# Patient Record
Sex: MALE | Employment: PART TIME | URBAN - METROPOLITAN AREA
[De-identification: names, ages, dates, MRNs, and addresses within clinical notes are randomized per-mention and may not be internally consistent; named-entity substitution may affect disease eponyms.]

---

## 2018-10-04 ENCOUNTER — HOSPITAL ENCOUNTER (EMERGENCY)
Facility: CLINIC | Age: 23
Discharge: HOME | End: 2018-10-04
Attending: EMERGENCY MEDICINE | Admitting: EMERGENCY MEDICINE
Payer: COMMERCIAL

## 2018-10-04 VITALS
SYSTOLIC BLOOD PRESSURE: 118 MMHG | RESPIRATION RATE: 16 BRPM | DIASTOLIC BLOOD PRESSURE: 81 MMHG | OXYGEN SATURATION: 99 % | TEMPERATURE: 98.3 F | HEART RATE: 91 BPM | WEIGHT: 210 LBS

## 2018-10-04 DIAGNOSIS — R00.2 PALPITATIONS: ICD-10-CM

## 2018-10-04 LAB
ANION GAP SERPL CALCULATED.3IONS-SCNC: 4 MMOL/L (ref 3–14)
BUN SERPL-MCNC: 12 MG/DL (ref 7–30)
CALCIUM SERPL-MCNC: 8.5 MG/DL (ref 8.5–10.1)
CHLORIDE SERPL-SCNC: 110 MMOL/L (ref 94–109)
CO2 SERPL-SCNC: 27 MMOL/L (ref 20–32)
CREAT SERPL-MCNC: 0.7 MG/DL (ref 0.66–1.25)
GFR SERPL CREATININE-BSD FRML MDRD: >90 ML/MIN/1.7M2
GLUCOSE SERPL-MCNC: 95 MG/DL (ref 70–99)
INTERPRETATION ECG - MUSE: NORMAL
POTASSIUM SERPL-SCNC: 4 MMOL/L (ref 3.4–5.3)
SODIUM SERPL-SCNC: 141 MMOL/L (ref 133–144)

## 2018-10-04 PROCEDURE — 93005 ELECTROCARDIOGRAM TRACING: CPT

## 2018-10-04 PROCEDURE — 99284 EMERGENCY DEPT VISIT MOD MDM: CPT

## 2018-10-04 PROCEDURE — 80048 BASIC METABOLIC PNL TOTAL CA: CPT | Performed by: EMERGENCY MEDICINE

## 2018-10-04 ASSESSMENT — ENCOUNTER SYMPTOMS
SHORTNESS OF BREATH: 0
LIGHT-HEADEDNESS: 1
PALPITATIONS: 1
DIAPHORESIS: 1

## 2018-10-04 NOTE — LETTER
October 4, 2018      To Whom It May Concern:      Tyrel Rankin was seen in our Emergency Department today, 10/04/18.  I expect his condition to improve over the next day.  He may return to school when improved.    Sincerely,        Chad A. Trierweiler, MD

## 2018-10-04 NOTE — ED AVS SNAPSHOT
Emergency Department    [address] [facility] [address]    Phone:  [phone]    Fax:  [phone]                                       Tyrel Rankin   MRN: [id number]    Department:   Emergency Department   Date of Visit:  10/4/2018           Patient Information     Date Of Birth          1995        Your diagnoses for this visit were:     Palpitations        You were seen by Trierweiler, Chad A, MD.      Follow-up Information     Follow up with Primary care physician In 1 week.        Follow up with  Emergency Department.    Specialty:  EMERGENCY MEDICINE    Why:  If symptoms worsen    Contact information:    [address] [facility] [place] [address] [phone]        Discharge Instructions       Discharge Instructions  Palpitations    Palpitations are an unusual awareness of your heartbeat. People often describe this as the heart skipping, fluttering, racing, irregular, or pounding. At this time, your provider has found no signs that your palpitations are due to a serious or life-threatening condition. However, sometimes there is a serious problem that does not show up right away.    Palpitations can be caused by caffeine, cigarettes, diet pills, energy drinks or supplements, other stimulants, and medications and street drugs. They can also be caused by anxiety, hormone conditions such as high thyroid, and other medical conditions. Sometimes they are a sign of abnormal rhythm in the heart. At this time, your provider did not find any dangerous cause of your symptoms.    Generally, every Emergency Department visit should have a follow-up clinic visit with either a primary or a specialty clinic/provider. Please follow-up as instructed by your emergency provider today.    Return to the Emergency Department if:    You get chest pain or tightness.    You are short of breath.    You get very weak or tired.    You pass out or faint.    Your heart rate is over 120 beats per  minute for more than 10 minutes while you are resting.     You have anything else that worries you.    What can I do to help myself?    Fill any prescriptions the provider gave you and take them right away.     Follow your provider s instructions about the prescription medicines you are on. Sometimes the provider may tell you to stop taking a medicine or change the dose.    If you smoke, this may be a good time to quit! The less you can smoke, the better.    Do not use energy drinks, diet pills, or stimulants. Limit your use of caffeine.  If you were given a prescription for medicine here today, be sure to read all of the information (including the package insert) that comes with your prescription.  This will include important information about the medicine, its side effects, and any warnings that you need to know about.  The pharmacist who fills the prescription can provide more information and answer questions you may have about the medicine.  If you have questions or concerns that the pharmacist cannot address, please call or return to the Emergency Department.     Remember that you can always come back to the Emergency Department if you are not able to see your regular provider in the amount of time listed above, if you get any new symptoms, or if there is anything that worries you.      24 Hour Appointment Hotline       To make an appointment at any [facility], call [phone] ([phone]). If you don't have a family doctor or clinic, we will help you find one. [facility] are conveniently located to serve the needs of you and your family.          ED Discharge Orders     Holter Monitor 24 hour - Adult                    Review of your medicines      Notice     You have not been prescribed any medications.            Procedures and tests performed during your visit     Basic metabolic panel    EKG 12 lead      Orders Needing Specimen Collection     None      Pending Results     No orders found  from 10/2/2018 to 10/5/2018.            Pending Culture Results     No orders found from 10/2/2018 to 10/5/2018.            Pending Results Instructions     If you had any lab results that were not finalized at the time of your Discharge, you can call the ED Lab Result RN at [phone]. You will be contacted by this team for any positive Lab results or changes in treatment. The nurses are available 7 days a week from 10A to 6:30P.  You can leave a message 24 hours per day and they will return your call.        Test Results From Your Hospital Stay        10/4/2018 10:12 PM      Component Results     Component Value Ref Range & Units Status    Sodium 141 133 - 144 mmol/L Final    Potassium 4.0 3.4 - 5.3 mmol/L Final    Chloride 110 (H) 94 - 109 mmol/L Final    Carbon Dioxide 27 20 - 32 mmol/L Final    Anion Gap 4 3 - 14 mmol/L Final    Glucose 95 70 - 99 mg/dL Final    Urea Nitrogen 12 7 - 30 mg/dL Final    Creatinine 0.70 0.66 - 1.25 mg/dL Final    GFR Estimate >90 >60 mL/min/1.7m2 Final    Non  GFR Calc    GFR Estimate If Black >90 >60 mL/min/1.7m2 Final    African American GFR Calc    Calcium 8.5 8.5 - 10.1 mg/dL Final                Clinical Quality Measure: Blood Pressure Screening     Your blood pressure was checked while you were in the emergency department today. The last reading we obtained was  BP: 122/83 . Please read the guidelines below about what these numbers mean and what you should do about them.  If your systolic blood pressure (the top number) is less than 120 and your diastolic blood pressure (the bottom number) is less than 80, then your blood pressure is normal. There is nothing more that you need to do about it.  If your systolic blood pressure (the top number) is 120-139 or your diastolic blood pressure (the bottom number) is 80-89, your blood pressure may be higher than it should be. You should have your blood pressure rechecked within a year by a primary care provider.  If  "your systolic blood pressure (the top number) is 140 or greater or your diastolic blood pressure (the bottom number) is 90 or greater, you may have high blood pressure. High blood pressure is treatable, but if left untreated over time it can put you at risk for heart attack, stroke, or kidney failure. You should have your blood pressure rechecked by a primary care provider within the next 4 weeks.  If your provider in the emergency department today gave you specific instructions to follow-up with your doctor or provider even sooner than that, you should follow that instruction and not wait for up to 4 weeks for your follow-up visit.        Thank you for choosing [facility]       Thank you for choosing [facility] for your care. Our goal is always to provide you with excellent care. Hearing back from our patients is one way we can continue to improve our services. Please take a few minutes to complete the written survey that you may receive in the mail after you visit with us. Thank you!        "[employer]hart Information     [employer] lets you send messages to your doctor, view your test results, renew your prescriptions, schedule appointments and more. To sign up, go to [email or web address] . Click on \"Log in\" on the left side of the screen, which will take you to the Welcome page. Then click on \"Sign up Now\" on the right side of the page.     You will be asked to enter the access code listed below, as well as some personal information. Please follow the directions to create your username and password.     Your access code is: [email or web address]  Expires: 2019 10:55 PM     Your access code will  in 90 days. If you need help or a new code, please call your [facility] clinic or [phone].        Care EveryWhere ID     This is your Care EveryWhere ID. This could be used by other organizations to access your [facility] medical records  [id number]        Equal Access to Services     WALKER SIMMS: Noris napoles" kendal Rodriguez, josette ashermapia fisher. So [facility] [phone].    ATENCIÓN: Si habla español, tiene a banuelos disposición servicios gratuitos de asistencia lingüística. Llame al [phone].    We comply with applicable federal civil rights laws and [place] laws. We do not discriminate on the basis of race, color, national origin, age, disability, sex, sexual orientation, or gender identity.            After Visit Summary       This is your record. Keep this with you and show to your community pharmacist(s) and doctor(s) at your next visit.

## 2018-10-04 NOTE — ED AVS SNAPSHOT
Emergency Department    [address] [place] [address]    Phone:  [phone]    Fax:  [phone]                                       Tyrel Rankin   MRN: [id number]    Department:   Emergency Department   Date of Visit:  10/4/2018           After Visit Summary Signature Page     I have received my discharge instructions, and my questions have been answered. I have discussed any challenges I see with this plan with the nurse or doctor.    ..........................................................................................................................................  Patient/Patient Representative Signature      ..........................................................................................................................................  Patient Representative Print Name and Relationship to Patient    ..................................................               ................................................  Date                                   Time    ..........................................................................................................................................  Reviewed by Signature/Title    ...................................................              ..............................................  Date                                               Time          22EPIC Rev 08/18

## 2018-10-05 NOTE — ED PROVIDER NOTES
"  History     Chief Complaint:  Palpitations    HPI   Tyrel Rankin is a 23 year old male who presents for evaluation of palpitations. He was playing video games tonight when he suddenly felt that his heart was beating abnormally fast. He got up to go get water, but upon standing his eyes \"went dark\" felt lightheaded, \"couldn't move\" so he lay down on the floor. His palpitations lasted about 3 minutes, and slowly resolved over the next few minutes. He had a hard time catching his breath during the episode, but denies any trouble breathing currently. He was also diaphoretic. He has had an episode similar to this a few weeks ago while he was laying down in bed. His heart was beating fast, and lasted about 2-3 minutes and resolved on his own. Denies any change in his normal daily routine, or abnormal food. He presents with EMS and had an EKG with sinus tachycardia to the 100's. Currently he is feeling well. Denies drug, weed, or cigarette use.     Of note, he immigrated here, and social security got his date of birth wrong and he is actually 19 years old.     Allergies:  No Known Drug Allergies      Medications:    The patient is not currently taking any prescribed medications.     Past Medical History:    The patient denies any significant past medical history.     Past Surgical History:    The patient does not have any pertinent past surgical history.     Family History:    No past pertinent family history.     Social History:  The patient presents in care of his brother. There is no exposure to smoke in the home, per parent present.      Review of Systems   Constitutional: Positive for diaphoresis.   Respiratory: Negative for shortness of breath.    Cardiovascular: Positive for palpitations.   Neurological: Positive for light-headedness. Negative for syncope.   All other systems reviewed and are negative.    Physical Exam     Patient Vitals for the past 24 hrs:   BP Temp Temp src Pulse Heart Rate Resp SpO2 Weight "   10/04/18 2214 - 98.3  F (36.8  C) Oral - 92 21 - -   10/04/18 2205 - - - - 89 19 - -   10/04/18 2200 - - - - 92 20 - -   10/04/18 2155 - - - - 92 15 - -   10/04/18 2150 - - - - 92 19 - -   10/04/18 2145 - - - - 91 17 - -   10/04/18 2140 - - - - 93 17 - -   10/04/18 2135 - - - - 98 26 - -   10/04/18 2130 - - - - 100 8 - -   10/04/18 2125 - - - - 104 18 - -   10/04/18 2120 - - - - - - 100 % -   10/04/18 2118 122/83 - - 108 - 16 100 % 95.3 kg (210 lb)        Physical Exam  Eye:  Pupils are equal, round, and reactive.  Extraocular movements intact.    ENT:  No rhinorrhea.  Moist mucus membranes.  Normal tongue and tonsil.    Cardiac:  Regular rate and rhythm.  No murmurs, gallops, or rubs.    Pulmonary:  Clear to auscultation bilaterally.  No wheezes, rales, or rhonchi.    Abdomen:  Positive bowel sounds.  Abdomen is soft and non-distended, without focal tenderness.    Musculoskeletal:  Normal movement of all extremities without evidence for deficit.    Skin:  Warm and dry without rashes.    Neurologic:  Non-focal exam without asymmetric weakness or numbness.     Psychiatric:  Normal affect with appropriate interaction with examiner.     Emergency Department Course     ECG:  ECG taken at 2137, ECG read at 2140  Normal sinus rhythm   Early repolarization  Normal ECG  Rate 97 bpm. SC interval 152. QRS duration 84. QT/QTc 320/406. P-R-T axes 62  80  58.     Laboratory:  Laboratory findings were communicated with the patient who voiced understanding of the findings.  BMP: Chloride: 110(H) o/w WNL (Creatinine 0.70)    Emergency Department Course:  Nursing notes and vitals reviewed.  I performed an exam of the patient as documented above.   Blood was drawn for laboratory testing, results above.   EKG obtained in the ED, see results above.      2245: I updated the patient and his mother.    Findings and plan explained to the patient and brother. Patient discharged home with instructions regarding supportive care,  medications, and reasons to return. The importance of close follow-up was reviewed.      I personally reviewed the laboratory results with the Patient and brother and answered all related questions prior to discharge.    Impression & Plan      Medical Decision Making:  This healthy young man presents to us with a second episode of palpitations which began approximately 1 hour prior to evaluation here.  2 weeks ago he notes he was simply lying on his bed when he suddenly felt as though his heart was racing with resolution of symptoms in approximately 3 minutes.  He was playing video games tonight when the exact same thing happened.  The only difference was that he tried to get up and walk during the spell today and felt very lightheaded though he did not suffer syncope.  There was no associated chest pain.  By the time he arrived here, his symptoms had completely resolved, lasting less than 5 minutes at home.  His physical exam is unremarkable.  His EKG is normal, showing no evidence of WPW, Brugada syndrome, hypertrophic cardiomyopathy, or other more serious causes of cardiac dysrhythmia.  Electrolyte panel is unremarkable and he appears well-hydrated.  With this, I explained that it can be challenging to know for sure what is occurring during the spells.  I am concerned for the possibility of a supraventricular tachycardia syndrome.  I will set him up with an outpatient Holter monitor, though I explained that this can be very difficult to capture as he needs to be wearing a monitor when this occurs.  I explained that if his Holter monitor is negative and he has further spells that he may require a zio patch or other long-term monitoring.  Otherwise, I do not feel that he has any other restrictions.  His family was wondering if he was having panic attacks which certainly could present with the symptoms.  I explained that we would first need to rule out cardiac causes before assuming this is psychiatric.   Nonetheless, they will continue to work this up as an outpatient with return to the ER for any emergent concerns.    Diagnosis:    ICD-10-CM    1. Palpitations R00.2 Holter Monitor 24 hour - Adult      Disposition:   Discharged to home    CMS Diagnoses: None     Discharge Medications:  New Prescriptions    No medications on file       Scribe Disclosure:  I, Sophie Pena, am serving as a scribe at 9:19 PM on 10/4/2018 to document services personally performed by Trierweiler, Chad A, MD, based on my observations and the provider's statements to me.     Sophie Pena  10/4/2018    EMERGENCY DEPARTMENT       Trierweiler, Chad A, MD  10/05/18 0338

## 2018-10-05 NOTE — DISCHARGE INSTRUCTIONS
Discharge Instructions  Palpitations    Palpitations are an unusual awareness of your heartbeat. People often describe this as the heart skipping, fluttering, racing, irregular, or pounding. At this time, your provider has found no signs that your palpitations are due to a serious or life-threatening condition. However, sometimes there is a serious problem that does not show up right away.    Palpitations can be caused by caffeine, cigarettes, diet pills, energy drinks or supplements, other stimulants, and medications and street drugs. They can also be caused by anxiety, hormone conditions such as high thyroid, and other medical conditions. Sometimes they are a sign of abnormal rhythm in the heart. At this time, your provider did not find any dangerous cause of your symptoms.    Generally, every Emergency Department visit should have a follow-up clinic visit with either a primary or a specialty clinic/provider. Please follow-up as instructed by your emergency provider today.    Return to the Emergency Department if:    You get chest pain or tightness.    You are short of breath.    You get very weak or tired.    You pass out or faint.    Your heart rate is over 120 beats per minute for more than 10 minutes while you are resting.     You have anything else that worries you.    What can I do to help myself?    Fill any prescriptions the provider gave you and take them right away.     Follow your provider s instructions about the prescription medicines you are on. Sometimes the provider may tell you to stop taking a medicine or change the dose.    If you smoke, this may be a good time to quit! The less you can smoke, the better.    Do not use energy drinks, diet pills, or stimulants. Limit your use of caffeine.  If you were given a prescription for medicine here today, be sure to read all of the information (including the package insert) that comes with your prescription.  This will include important information about  the medicine, its side effects, and any warnings that you need to know about.  The pharmacist who fills the prescription can provide more information and answer questions you may have about the medicine.  If you have questions or concerns that the pharmacist cannot address, please call or return to the Emergency Department.     Remember that you can always come back to the Emergency Department if you are not able to see your regular provider in the amount of time listed above, if you get any new symptoms, or if there is anything that worries you.

## 2018-10-05 NOTE — ED NOTES
Bed: ED02  Expected date: 10/4/18  Expected time: 9:00 PM  Means of arrival: Ambulance  Comments:  [facility] [address] 23M rapid pulse

## 2018-10-09 ENCOUNTER — HOSPITAL ENCOUNTER (EMERGENCY)
Facility: CLINIC | Age: 23
Discharge: HOME | End: 2018-10-09
Attending: EMERGENCY MEDICINE

## 2018-10-09 ENCOUNTER — HOSPITAL ENCOUNTER (EMERGENCY)
Facility: CLINIC | Age: 23
Discharge: HOME | End: 2018-10-09
Attending: EMERGENCY MEDICINE | Admitting: EMERGENCY MEDICINE
Payer: COMMERCIAL

## 2018-10-09 VITALS
TEMPERATURE: 98.8 F | DIASTOLIC BLOOD PRESSURE: 82 MMHG | HEIGHT: 67 IN | SYSTOLIC BLOOD PRESSURE: 113 MMHG | WEIGHT: 132 LBS | OXYGEN SATURATION: 100 % | BODY MASS INDEX: 20.72 KG/M2 | RESPIRATION RATE: 18 BRPM

## 2018-10-09 VITALS
RESPIRATION RATE: 18 BRPM | HEIGHT: 67 IN | HEART RATE: 176 BPM | OXYGEN SATURATION: 100 % | DIASTOLIC BLOOD PRESSURE: 82 MMHG | BODY MASS INDEX: 20.72 KG/M2 | TEMPERATURE: 98.8 F | SYSTOLIC BLOOD PRESSURE: 113 MMHG | WEIGHT: 132 LBS

## 2018-10-09 DIAGNOSIS — I47.10 PAROXYSMAL SUPRAVENTRICULAR TACHYCARDIA (H): ICD-10-CM

## 2018-10-09 LAB
ANION GAP SERPL CALCULATED.3IONS-SCNC: 6 MMOL/L (ref 3–14)
BUN SERPL-MCNC: 14 MG/DL (ref 7–30)
CALCIUM SERPL-MCNC: 8.4 MG/DL (ref 8.5–10.1)
CHLORIDE SERPL-SCNC: 108 MMOL/L (ref 94–109)
CO2 SERPL-SCNC: 27 MMOL/L (ref 20–32)
CREAT SERPL-MCNC: 0.62 MG/DL (ref 0.66–1.25)
ERYTHROCYTE [DISTWIDTH] IN BLOOD BY AUTOMATED COUNT: 12.2 % (ref 10–15)
GFR SERPL CREATININE-BSD FRML MDRD: >90 ML/MIN/1.7M2
GLUCOSE SERPL-MCNC: 79 MG/DL (ref 70–99)
HCT VFR BLD AUTO: 43.5 % (ref 40–53)
HGB BLD-MCNC: 14.6 G/DL (ref 13.3–17.7)
INTERPRETATION ECG - MUSE: NORMAL
INTERPRETATION ECG - MUSE: NORMAL
MCH RBC QN AUTO: 31.6 PG (ref 26.5–33)
MCHC RBC AUTO-ENTMCNC: 33.6 G/DL (ref 31.5–36.5)
MCV RBC AUTO: 94 FL (ref 78–100)
PLATELET # BLD AUTO: 173 10E9/L (ref 150–450)
POTASSIUM SERPL-SCNC: 4.1 MMOL/L (ref 3.4–5.3)
RBC # BLD AUTO: 4.62 10E12/L (ref 4.4–5.9)
SODIUM SERPL-SCNC: 141 MMOL/L (ref 133–144)
TSH SERPL DL<=0.005 MIU/L-ACNC: 0.96 MU/L (ref 0.4–4)
WBC # BLD AUTO: 6 10E9/L (ref 4–11)

## 2018-10-09 PROCEDURE — 99284 EMERGENCY DEPT VISIT MOD MDM: CPT

## 2018-10-09 PROCEDURE — 80048 BASIC METABOLIC PNL TOTAL CA: CPT | Performed by: EMERGENCY MEDICINE

## 2018-10-09 PROCEDURE — 84443 ASSAY THYROID STIM HORMONE: CPT | Performed by: EMERGENCY MEDICINE

## 2018-10-09 PROCEDURE — 25000132 ZZH RX MED GY IP 250 OP 250 PS 637: Performed by: EMERGENCY MEDICINE

## 2018-10-09 PROCEDURE — 93005 ELECTROCARDIOGRAM TRACING: CPT

## 2018-10-09 PROCEDURE — 85027 COMPLETE CBC AUTOMATED: CPT | Performed by: EMERGENCY MEDICINE

## 2018-10-09 PROCEDURE — 93005 ELECTROCARDIOGRAM TRACING: CPT | Mod: 76

## 2018-10-09 RX ORDER — METOPROLOL SUCCINATE 25 MG/1
25 TABLET, EXTENDED RELEASE ORAL DAILY
Qty: 30 TABLET | Refills: 0 | Status: SHIPPED | OUTPATIENT
Start: 2018-10-09

## 2018-10-09 RX ORDER — METOPROLOL SUCCINATE 25 MG/1
25 TABLET, EXTENDED RELEASE ORAL DAILY
Status: DISCONTINUED | OUTPATIENT
Start: 2018-10-09 | End: 2018-10-09 | Stop reason: HOSPADM

## 2018-10-09 RX ADMIN — METOPROLOL SUCCINATE 25 MG: 25 TABLET, EXTENDED RELEASE ORAL at 11:41

## 2018-10-09 ASSESSMENT — ENCOUNTER SYMPTOMS: DIZZINESS: 1

## 2018-10-09 NOTE — ED AVS SNAPSHOT
Emergency Department    [address] [place] [address]    Phone:  [phone]    Fax:  [phone]                                       Tyrel Rankin   MRN: [id number]    Department:   Emergency Department   Date of Visit:  10/9/2018           After Visit Summary Signature Page     I have received my discharge instructions, and my questions have been answered. I have discussed any challenges I see with this plan with the nurse or doctor.    ..........................................................................................................................................  Patient/Patient Representative Signature      ..........................................................................................................................................  Patient Representative Print Name and Relationship to Patient    ..................................................               ................................................  Date                                   Time    ..........................................................................................................................................  Reviewed by Signature/Title    ...................................................              ..............................................  Date                                               Time          22EPIC Rev 08/18

## 2018-10-09 NOTE — ED AVS SNAPSHOT
Emergency Department    [address] [place] [address]    Phone:  [phone]    Fax:  [phone]                                       Tyrel Rankin   MRN: [id number]    Department:   Emergency Department   Date of Visit:  10/9/2018           Patient Information     Date Of Birth          1995        Your diagnoses for this visit were:     Paroxysmal supraventricular tachycardia (H)        You were seen by Kermit Meredith MD.      Follow-up Information     Follow up with Jermaine Sanchez MD.    Specialties:  Cardiology, Cardiology    Contact information:    [address] [facility] [address]  [place] [address] [phone]          Discharge Instructions         Having Catheter Ablation  A heart rhythm problem (arrhythmia) can make your heart beat too fast or in an irregular pattern. The problem is often caused by cells in your heart that aren t working as they should. Or, it may be the result of an abnormal electric circuit. It may cause bothersome symptoms, such as an irregular heartbeat, dizziness, shortness of breath, chest pain, or fainting. Your doctor has recommended catheter ablation to treat your arrhythmia. This non-surgical procedure destroys the cells that are causing the problem.  Before the procedure    Before your catheter ablation, you will meet with a specially trained heart doctor (cardiac electrophysiologist) who will do the procedure. He or she will tell you how to get ready. You will likely be told to stop or change your heart rhythm medicines for a period of time before the procedure. Follow your doctor s instructions. Also:    Tell the doctor about all prescription and over-the-counter medicines you take. This includes herbs, supplements, and vitamins. It also includes daily medicines, such as insulin or blood thinners. If you are allergic to any medicines, tell the doctor.    Have any routine tests, such as blood tests, as recommended.    Don t eat or drink  anything 12 hours before the procedure.  How catheter ablation is done  Catheter ablation uses thin, flexible wires (electrode catheters) to find and destroy (ablate) problem cells. Here s how the procedure is done:    The heart s signals are mapped. To find the problem, an electrophysiology study (EPS) is done. During this study, the doctor tries to start (induce) your arrhythmia. An electrical map of the heart is then created. This shows the type of arrhythmia you have and where the problem is. Using the map as a guide, the doctor knows where to ablate.    Problem areas are destroyed using heat or cold therapy. Once the EPS shows where the problem is, the doctor threads an electrode catheter through a blood vessel to that area in the heart. Energy is sent through the catheter to destroy the problem cells.    The heart s rhythm is tested again. After ablating the problem cells, the doctor tries to restart (reinduce) your arrhythmia. If a fast rhythm can t be induced, the ablation is a success. But if a fast rhythm does start again, you may need more ablation.  Your experience during catheter ablation  In most cases, catheter ablation is done in an electrophysiology (EP) lab. It often takes 2 to 4 hours, and sometimes longer. You ll receive medicine to prevent pain. Medicine will also help you relax or sleep during the procedure. If you feel uncomfortable during the procedure, tell the doctor or nurse:    Getting started. The healthcare team washes the skin on your groin (or rarely, the neck). Any hair in that area may be removed. This is where the catheters will be inserted. An IV (intravenous) line is started in your arm. Medicines and fluids are given through this IV. To help keep the insertion site germ-free (sterile), your body is draped with sheets. Only the area where the catheters will be inserted is exposed.    Inserting the catheters. The healthcare provider numbs the skin where the catheters will be  inserted with pain medicine (local anesthetic). Then the provider uses a small needle to make punctures in your vein or artery. He or she puts catheters through these punctures and guides them to your heart. The provider uses X-ray monitors to help guide the catheters.    The provider then puts wires in several places in the heart to map the electrical signals. The wires also stimulate the heart.    Finishing up. When the procedure is finished, the provider takes the catheters out of your body. He or she puts pressure on the puncture sites to stop any bleeding. No stitches are needed. You re then taken to a recovery room to rest. You'll need to remain lying down for 2 to 6 hours. You'll also be asked not to move the leg where the catheters were inserted for a few hours. This is to make sure the insertion sites don't bleed.  Risks and complications  The risks of catheter ablation are fairly low compared to the benefits you receive. Discuss these risks with your doctor before the procedure. Possible risks and complications include:    Bleeding or bruising at the catheter insertion site    Blood clots    A slow heart rhythm (requiring a permanent pacemaker)    Perforation of the heart muscle, blood vessel, or lung (may require an emergency procedure)    Damage to a heart valve (rare)    Stroke or heart attack, also known as acute myocardial infarction, or AMI (rare)    Infection, which is a risk after any invasive procedure. This may be indicated by a fever of 100.4 F (38.0 C) or higher, drainage, or redness and pain at the catheter insertion site.    Death (extremely rare)   Date Last Reviewed: 5/1/2016 2000-2017 [employer]. [address] [place] [address]. All rights reserved. This information is not intended as a substitute for professional medical care. Always follow your healthcare professional's instructions.          Your Heart s Electrical System    The heartbeat is the rhythmic  contraction of the heart muscle's 4 chambers. The heart muscle has a special system that creates and sends electrical signals to activate these 4 chambers. First, a signal generated in the right upper chamber of the heart tells the 2 upper chambers (atria) to squeeze. This moves blood to the 2 lower chambers (ventricles). Next, electrical signals tell the ventricles to squeeze. This moves blood to the lungs, brain, heart, and body.  Electrical signals  Groups of special cells in the right atrium, called nodes, send out the heart s electrical signals. The signals travel along pathways. In the ventricles, these pathways are called bundle branches.  The SA (sinoatrial) node  This sets the pace of the heartbeat. It starts each beat by releasing a signal telling the atria to squeeze.  The AV (atrioventricular) node  This receives the signal from the atria. It is the  gateway  between the atria and the ventricles. The AV node channels the signal into the ventricles.  The Bundle branches  These carry the signal through the ventricle walls. As the signal moves through the ventricles, the ventricles squeeze.  Date Last Reviewed: 4/27/2016 2000-2017 [employer]. [address] [place] [address]. All rights reserved. This information is not intended as a substitute for professional medical care. Always follow your healthcare professional's instructions.          Treatment for Supraventricular Tachycardia  Supraventricular tachycardia (SVT) is a type of abnormally fast heartbeat. The heart normally beats 60 to 100 beats per minute. With SVT, the heart beats more than 100 times a minute. It may beat as fast as 250 times a minute. This is caused by a problem in the electrical system of the heart. It can lessen the amount of blood pumped through the heart.  Types of treatment  You may not need treatment for SVT if you have only had one episode. If you do need treatment, there are several kinds. They  include:    Valsalva maneuver. This is a way to increase pressure in the abdomen and chest. It can correct your heart rhythm right away. To do it, you bear down with your stomach muscles, as though you are trying to have a bowel movement.    Carotid massage. Your healthcare provider may gently rub the carotid artery in your neck. This can also help correct the heart rhythm right away.    Medicine. There are various kinds you can take. Calcium channel blockers or adenosine can help correct heart rhythm right away. If you have SVT only 1 or 2 times a year, you may take beta-blockers or calcium channel medicine as needed. If your SVT is more frequent, you may need to take medicine every day. Some people may need to take several medicines to prevent episodes of SVT.    Electrocardioversion. This is a shock to the heart to restart a normal rhythm right away. This may be done if you have a severe episode of SVT.    Catheter ablation. This can help permanently stop SVT. Your healthcare provider puts a thin, flexible tube (catheter) into a blood vessel in the groin. He or she then gently pushes it up into your heart. The area of your heart that causes your SVT is then burned. This prevents that area from starting a signal that causes SVT.   Lifestyle changes to help prevent SVT episodes  Your healthcare provider might suggest other ways to help prevent SVT, such as:    Having less alcohol and caffeine    Not smoking    Lowering your stress    Eating foods that are healthy for your heart  When to call your healthcare provider  Call your healthcare provider if you have any of the following:    Sudden shortness of breath (call 911)    Severe palpitations    Severe dizziness    Severe chest pain    Symptoms that are happening more often   Date Last Reviewed: 8/10/2015    7426-4372 [employer]. [address] [place] [address], [place] [address]. All rights reserved. This information is not intended as a substitute for  professional medical care. Always follow your healthcare professional's instructions.          Your next 10 appointments already scheduled     Oct 12, 2018 11:00 AM CDT   Holter Monitor with JULIÁN   [facility] - [facility] ([facility])    [address]  [place] [address] [phone]            Nov 15, 2018 12:15 PM CST   New Visit with Derik Quezada MD   [facility] ([facility])    [address]  [place] [address] [phone] OPT 2              24 Hour Appointment Hotline       To make an appointment at any [facility], call [phone] ([phone]). If you don't have a family doctor or clinic, we will help you find one. [facility] clinics are conveniently located to serve the needs of you and your family.             Review of your medicines      START taking        Dose / Directions Last dose taken    metoprolol succinate 25 MG 24 hr tablet   Commonly known as:  TOPROL-XL   Dose:  25 mg   Quantity:  30 tablet        Take 1 tablet (25 mg) by mouth daily   Refills:  0                Prescriptions were sent or printed at these locations (1 Prescription)                   Other Prescriptions                Printed at Department/Unit printer (1 of 1)         metoprolol succinate (TOPROL-XL) 25 MG 24 hr tablet                Procedures and tests performed during your visit     Procedure/Test Number of Times Performed    Basic metabolic panel 1    CBC (+ platelets, no diff) 1    EKG 12 lead 2    TSH with free T4 reflex 1      Orders Needing Specimen Collection     None      Pending Results     No orders found from 10/7/2018 to 10/10/2018.            Pending Culture Results     No orders found from 10/7/2018 to 10/10/2018.            Pending Results Instructions     If you had any lab results that were not finalized at the time of your Discharge, you can call the ED Lab  Result RN at [phone]. You will be contacted by this team for any positive Lab results or changes in treatment. The nurses are available 7 days a week from 10A to 6:30P.  You can leave a message 24 hours per day and they will return your call.        Test Results From Your Hospital Stay        10/9/2018 11:31 AM      Component Results     Component Value Ref Range & Units Status    TSH 0.96 0.40 - 4.00 mU/L Final         10/9/2018 11:26 AM      Component Results     Component Value Ref Range & Units Status    Sodium 141 133 - 144 mmol/L Final    Potassium 4.1 3.4 - 5.3 mmol/L Final    Chloride 108 94 - 109 mmol/L Final    Carbon Dioxide 27 20 - 32 mmol/L Final    Anion Gap 6 3 - 14 mmol/L Final    Glucose 79 70 - 99 mg/dL Final    Urea Nitrogen 14 7 - 30 mg/dL Final    Creatinine 0.62 (L) 0.66 - 1.25 mg/dL Final    GFR Estimate >90 >60 mL/min/1.7m2 Final    Non  GFR Calc    GFR Estimate If Black >90 >60 mL/min/1.7m2 Final    African American GFR Calc    Calcium 8.4 (L) 8.5 - 10.1 mg/dL Final         10/9/2018 11:18 AM      Component Results     Component Value Ref Range & Units Status    WBC 6.0 4.0 - 11.0 10e9/L Final    RBC Count 4.62 4.4 - 5.9 10e12/L Final    Hemoglobin 14.6 13.3 - 17.7 g/dL Final    Hematocrit 43.5 40.0 - 53.0 % Final    MCV 94 78 - 100 fl Final    MCH 31.6 26.5 - 33.0 pg Final    MCHC 33.6 31.5 - 36.5 g/dL Final    RDW 12.2 10.0 - 15.0 % Final    Platelet Count 173 150 - 450 10e9/L Final                Clinical Quality Measure: Blood Pressure Screening     Your blood pressure was checked while you were in the emergency department today. The last reading we obtained was  BP: 113/82 . Please read the guidelines below about what these numbers mean and what you should do about them.  If your systolic blood pressure (the top number) is less than 120 and your diastolic blood pressure (the bottom number) is less than 80, then your blood pressure is normal. There is nothing more  "that you need to do about it.  If your systolic blood pressure (the top number) is 120-139 or your diastolic blood pressure (the bottom number) is 80-89, your blood pressure may be higher than it should be. You should have your blood pressure rechecked within a year by a primary care provider.  If your systolic blood pressure (the top number) is 140 or greater or your diastolic blood pressure (the bottom number) is 90 or greater, you may have high blood pressure. High blood pressure is treatable, but if left untreated over time it can put you at risk for heart attack, stroke, or kidney failure. You should have your blood pressure rechecked by a primary care provider within the next 4 weeks.  If your provider in the emergency department today gave you specific instructions to follow-up with your doctor or provider even sooner than that, you should follow that instruction and not wait for up to 4 weeks for your follow-up visit.        Thank you for choosing [facility]       Thank you for choosing [facility] for your care. Our goal is always to provide you with excellent care. Hearing back from our patients is one way we can continue to improve our services. Please take a few minutes to complete the written survey that you may receive in the mail after you visit with us. Thank you!        [employer] Information     [employer] lets you send messages to your doctor, view your test results, renew your prescriptions, schedule appointments and more. To sign up, go to [email or web address] . Click on \"Log in\" on the left side of the screen, which will take you to the Welcome page. Then click on \"Sign up Now\" on the right side of the page.     You will be asked to enter the access code listed below, as well as some personal information. Please follow the directions to create your username and password.     Your access code is: [email or web address]  Expires: 2019 10:55 PM     Your access code will  in 90 days. If you need help or a " new code, please call your [facility] clinic or [phone].        Care EveryWhere ID     This is your Care EveryWhere ID. This could be used by other organizations to access your [facility] medical records  [id number]        Equal Access to Services     WALKER DELVALLE : Noris Rodriguez, wafrancescoda luqadaha, qaybta kaalmada chico, pia rosado. So [facility] [phone].    ATENCIÓN: Si habla español, tiene a banuelos disposición servicios gratuitos de asistencia lingüística. Llame al [phone].    We comply with applicable federal civil rights laws and [place] laws. We do not discriminate on the basis of race, color, national origin, age, disability, sex, sexual orientation, or gender identity.            After Visit Summary       This is your record. Keep this with you and show to your community pharmacist(s) and doctor(s) at your next visit.

## 2018-10-09 NOTE — DISCHARGE INSTRUCTIONS
Having Catheter Ablation  A heart rhythm problem (arrhythmia) can make your heart beat too fast or in an irregular pattern. The problem is often caused by cells in your heart that aren t working as they should. Or, it may be the result of an abnormal electric circuit. It may cause bothersome symptoms, such as an irregular heartbeat, dizziness, shortness of breath, chest pain, or fainting. Your doctor has recommended catheter ablation to treat your arrhythmia. This non-surgical procedure destroys the cells that are causing the problem.  Before the procedure    Before your catheter ablation, you will meet with a specially trained heart doctor (cardiac electrophysiologist) who will do the procedure. He or she will tell you how to get ready. You will likely be told to stop or change your heart rhythm medicines for a period of time before the procedure. Follow your doctor s instructions. Also:    Tell the doctor about all prescription and over-the-counter medicines you take. This includes herbs, supplements, and vitamins. It also includes daily medicines, such as insulin or blood thinners. If you are allergic to any medicines, tell the doctor.    Have any routine tests, such as blood tests, as recommended.    Don t eat or drink anything 12 hours before the procedure.  How catheter ablation is done  Catheter ablation uses thin, flexible wires (electrode catheters) to find and destroy (ablate) problem cells. Here s how the procedure is done:    The heart s signals are mapped. To find the problem, an electrophysiology study (EPS) is done. During this study, the doctor tries to start (induce) your arrhythmia. An electrical map of the heart is then created. This shows the type of arrhythmia you have and where the problem is. Using the map as a guide, the doctor knows where to ablate.    Problem areas are destroyed using heat or cold therapy. Once the EPS shows where the problem is, the doctor threads an electrode  catheter through a blood vessel to that area in the heart. Energy is sent through the catheter to destroy the problem cells.    The heart s rhythm is tested again. After ablating the problem cells, the doctor tries to restart (reinduce) your arrhythmia. If a fast rhythm can t be induced, the ablation is a success. But if a fast rhythm does start again, you may need more ablation.  Your experience during catheter ablation  In most cases, catheter ablation is done in an electrophysiology (EP) lab. It often takes 2 to 4 hours, and sometimes longer. You ll receive medicine to prevent pain. Medicine will also help you relax or sleep during the procedure. If you feel uncomfortable during the procedure, tell the doctor or nurse:    Getting started. The healthcare team washes the skin on your groin (or rarely, the neck). Any hair in that area may be removed. This is where the catheters will be inserted. An IV (intravenous) line is started in your arm. Medicines and fluids are given through this IV. To help keep the insertion site germ-free (sterile), your body is draped with sheets. Only the area where the catheters will be inserted is exposed.    Inserting the catheters. The healthcare provider numbs the skin where the catheters will be inserted with pain medicine (local anesthetic). Then the provider uses a small needle to make punctures in your vein or artery. He or she puts catheters through these punctures and guides them to your heart. The provider uses X-ray monitors to help guide the catheters.    The provider then puts wires in several places in the heart to map the electrical signals. The wires also stimulate the heart.    Finishing up. When the procedure is finished, the provider takes the catheters out of your body. He or she puts pressure on the puncture sites to stop any bleeding. No stitches are needed. You re then taken to a recovery room to rest. You'll need to remain lying down for 2 to 6 hours. You'll  also be asked not to move the leg where the catheters were inserted for a few hours. This is to make sure the insertion sites don't bleed.  Risks and complications  The risks of catheter ablation are fairly low compared to the benefits you receive. Discuss these risks with your doctor before the procedure. Possible risks and complications include:    Bleeding or bruising at the catheter insertion site    Blood clots    A slow heart rhythm (requiring a permanent pacemaker)    Perforation of the heart muscle, blood vessel, or lung (may require an emergency procedure)    Damage to a heart valve (rare)    Stroke or heart attack, also known as acute myocardial infarction, or AMI (rare)    Infection, which is a risk after any invasive procedure. This may be indicated by a fever of 100.4 F (38.0 C) or higher, drainage, or redness and pain at the catheter insertion site.    Death (extremely rare)   Date Last Reviewed: 5/1/2016 2000-2017 [employer]. [address] [place] [address]. All rights reserved. This information is not intended as a substitute for professional medical care. Always follow your healthcare professional's instructions.          Your Heart s Electrical System    The heartbeat is the rhythmic contraction of the heart muscle's 4 chambers. The heart muscle has a special system that creates and sends electrical signals to activate these 4 chambers. First, a signal generated in the right upper chamber of the heart tells the 2 upper chambers (atria) to squeeze. This moves blood to the 2 lower chambers (ventricles). Next, electrical signals tell the ventricles to squeeze. This moves blood to the lungs, brain, heart, and body.  Electrical signals  Groups of special cells in the right atrium, called nodes, send out the heart s electrical signals. The signals travel along pathways. In the ventricles, these pathways are called bundle branches.  The SA (sinoatrial) node  This sets the pace  of the heartbeat. It starts each beat by releasing a signal telling the atria to squeeze.  The AV (atrioventricular) node  This receives the signal from the atria. It is the  gateway  between the atria and the ventricles. The AV node channels the signal into the ventricles.  The Bundle branches  These carry the signal through the ventricle walls. As the signal moves through the ventricles, the ventricles squeeze.  Date Last Reviewed: 4/27/2016 2000-2017 [employer]. [address] [place] [address]. All rights reserved. This information is not intended as a substitute for professional medical care. Always follow your healthcare professional's instructions.          Treatment for Supraventricular Tachycardia  Supraventricular tachycardia (SVT) is a type of abnormally fast heartbeat. The heart normally beats 60 to 100 beats per minute. With SVT, the heart beats more than 100 times a minute. It may beat as fast as 250 times a minute. This is caused by a problem in the electrical system of the heart. It can lessen the amount of blood pumped through the heart.  Types of treatment  You may not need treatment for SVT if you have only had one episode. If you do need treatment, there are several kinds. They include:    Valsalva maneuver. This is a way to increase pressure in the abdomen and chest. It can correct your heart rhythm right away. To do it, you bear down with your stomach muscles, as though you are trying to have a bowel movement.    Carotid massage. Your healthcare provider may gently rub the carotid artery in your neck. This can also help correct the heart rhythm right away.    Medicine. There are various kinds you can take. Calcium channel blockers or adenosine can help correct heart rhythm right away. If you have SVT only 1 or 2 times a year, you may take beta-blockers or calcium channel medicine as needed. If your SVT is more frequent, you may need to take medicine every day. Some  people may need to take several medicines to prevent episodes of SVT.    Electrocardioversion. This is a shock to the heart to restart a normal rhythm right away. This may be done if you have a severe episode of SVT.    Catheter ablation. This can help permanently stop SVT. Your healthcare provider puts a thin, flexible tube (catheter) into a blood vessel in the groin. He or she then gently pushes it up into your heart. The area of your heart that causes your SVT is then burned. This prevents that area from starting a signal that causes SVT.   Lifestyle changes to help prevent SVT episodes  Your healthcare provider might suggest other ways to help prevent SVT, such as:    Having less alcohol and caffeine    Not smoking    Lowering your stress    Eating foods that are healthy for your heart  When to call your healthcare provider  Call your healthcare provider if you have any of the following:    Sudden shortness of breath (call 911)    Severe palpitations    Severe dizziness    Severe chest pain    Symptoms that are happening more often   Date Last Reviewed: 8/10/2015    0351-2371 [employer]. [address] [place] [address]. All rights reserved. This information is not intended as a substitute for professional medical care. Always follow your healthcare professional's instructions.

## 2018-10-09 NOTE — ED PROVIDER NOTES
History     Chief Complaint:  Tachycardia    Patient gave wrong birthday, this chart should be deleted.  A second chart was made with the correct information.    HPI       Review of Systems   Cardiovascular: Positive for palpitations. Negative for chest pain and leg swelling.   All other systems reviewed and are negative.        Physical Exam       Physical Exam        Emergency Department Course           Impression & Plan                             Kermit Meredith MD  10/23/18 3637

## 2018-10-09 NOTE — ED PROVIDER NOTES
"  History     Chief Complaint:  Tachycardia    HPI   Chucho Sarah is a 19 year old male who presents with concern for tachycardia. The patient reports that he was at school this morning when he began to feel his heart beat faster in class, and when he stood up he felt dizzy, prompting him to visit the school nurse. The school nurse noted that his tachycardia, and called EMS, who brought the patient here. The patient states that he has had four episodes of increased heart rate in the last three weeks, and that the last episode, which occurred last week, happened after he got up from the couch to walk to the kitchen, and at that time he noted dizziness and his \"eyes went dark.\" He notes a brief loss of consciousness at that time before his family called EMS, and he was brought here. The patient denies taking street drugs, alcohol, caffeine, noticing a weight change, ever passing out while exercising, as well as all other concerns here in the ER today.    Cardiac/PE/DVT Risk Factors:  The patient has no history of hypertension, hyperlipidemia, diabetes, or smoking. He reports no family history of heart disease. The patient denies any personal or familial history of PE, DVT, or clotting disorder. The patient reports denies recent travel, surgery, or other immobilizations.       Allergies:  NKDA    Medications:    The patient is currently on no regular medications.    Past Medical History:    The patient denies any significant past medical history.    Past Surgical History:    The patient does not have any pertinent past surgical history.    Family History:    No past pertinent family history.    Social History:  Not on file    Review of Systems   Neurological: Positive for dizziness.   All other systems reviewed and are negative.      Physical Exam     Patient Vitals for the past 24 hrs:   BP Temp Temp src Heart Rate Resp SpO2 Height Weight   10/09/18 1105 - - - 92 - - - -   10/09/18 1100 - - - 92 - - - - " "  10/09/18 1055 - - - 98 - - - -   10/09/18 1050 - - - 87 - - - -   10/09/18 1048 113/82 98.8  F (37.1  C) Temporal 174 18 100 % 1.702 m (5' 7\") 59.9 kg (132 lb)         Physical Exam    GENERAL: well developed, pleasant  HEAD: atraumatic  EYES: pupils reactive, extraocular muscles intact, conjunctivae normal  ENT:  mucus membranes moist  NECK:  trachea midline, normal range of motion  RESPIRATORY: no tachypnea, breath sounds clear to auscultation   CVS: Tachycardia  ABDOMEN: soft, nontender, nondistention  MUSCULOSKELETAL: no deformities  SKIN: warm and dry, no acute rashes or ulceration  NEURO: GCS 15, cranial nerves intact, alert and oriented x3  PSYCH:  Mood/affect normal      Emergency Department Course   ECG:    Indication: tachycardia  Time: 1033  Vent. Rate 177 bpm. AL interval *. QRS duration 80. QT/QTc 262/449. P-R-T axis * 84 47.  Supraventricular tachycardia. Otherwise normal ECG. Read time: 1037    Indication: recent heart rate conversion  Time: 1038  Vent. Rate 95 bpm. AL interval 152. QRS duration 84. QT/QTc 322/404. P-R-T axis 79 83 67.  Normal sinus rhythm. Possible left atrial enlargement. ST elevation, probably due to early repolarization. Borderline ECG. Read time: 1038      Laboratory:  CBC: WBC: 6.0, HGB: 14.6, PLT: 173  BMP: Calcium: 8.4 (L), o/w WNL (Creatinine: 0.62 (L))    TSH with free T4: 0.96    Emergency Department Course:  Nursing notes and vitals reviewed. (1033) I performed an exam of the patient as documented above.     IV inserted. Medicine administered as documented above. Blood drawn. This was sent to the lab for further testing, results above.    EKG obtained in the ED, see results above.     (1109) I consulted with Dr. Sanchez, electrophysiology, regarding the patient's history and presentation here in the emergency department.    (1114) I rechecked the patient and discussed the results of his workup thus far.     Findings and plan explained to the Patient. Patient discharged " home with instructions regarding supportive care, medications, and reasons to return. The importance of close follow-up was reviewed. The patient was prescribed metoprolol.    I personally reviewed the laboratory results with the Patient and answered all related questions prior to discharge.     Impression & Plan      Medical Decision Making:    Tyrel Rankin is a 23 year old male who presents with tachycardia and EKG shows SVT. He was recently seen here for the same thing although could not find a record of this, and he gave a different birth date when he was here before so the charts had to be merged, complicating matters. Patient bared down per request and I raised his legs while he was laying supine and was able to break him of the episode. Patient notes that over the last three weeks this is fourth episode. Did speak with EP, Dr. Sanchez, who suggested ablation but as an outpatient. Discussed this with pateint and his dad who is non-english-speaking. I tried to show a picture of this on the computer and a You Tube video to help further explain this.     Diagnosis:    ICD-10-CM    1. Paroxysmal supraventricular tachycardia (H) I47.1 TSH with free T4 reflex     Basic metabolic panel       Disposition:  discharged to home    Discharge Medications:  New Prescriptions    METOPROLOL SUCCINATE (TOPROL-XL) 25 MG 24 HR TABLET    Take 1 tablet (25 mg) by mouth daily         Ronald Cuellar  10/9/2018    EMERGENCY DEPARTMENT       Kermit Meredith MD  10/12/18 6716

## 2018-10-12 ENCOUNTER — HOSPITAL ENCOUNTER (OUTPATIENT)
Dept: CARDIOLOGY | Facility: CLINIC | Age: 23
Discharge: HOME | End: 2018-10-12
Attending: EMERGENCY MEDICINE | Admitting: EMERGENCY MEDICINE
Payer: COMMERCIAL

## 2018-10-12 DIAGNOSIS — R00.2 PALPITATIONS: ICD-10-CM

## 2018-10-12 PROCEDURE — 93225 XTRNL ECG REC<48 HRS REC: CPT | Performed by: EMERGENCY MEDICINE

## 2018-10-12 PROCEDURE — 93227 XTRNL ECG REC<48 HR R&I: CPT | Performed by: INTERNAL MEDICINE

## 2018-10-12 ASSESSMENT — ENCOUNTER SYMPTOMS: PALPITATIONS: 1

## 2018-10-16 LAB — INTERPRETATION MONITOR -MUSE: NORMAL

## 2018-10-18 ENCOUNTER — TELEPHONE (OUTPATIENT)
Dept: CARDIOLOGY | Facility: CLINIC | Age: 23
End: 2018-10-18

## 2018-10-18 NOTE — TELEPHONE ENCOUNTER
"Per Dr Sanchez- pt recently in ED for SVT (10-9-18) ; recommends office follow up \"in a couple weeks\".  OV scheduled for 11-2-18 @ 1245 pm. Writer left a message for pt to call to confirm or cancel this visit. SueLangenbrunnerRN  "

## 2018-10-29 PROBLEM — I47.10 PAROXYSMAL SUPRAVENTRICULAR TACHYCARDIA (H): Status: ACTIVE | Noted: 2018-10-29

## 2018-11-01 ENCOUNTER — TELEPHONE (OUTPATIENT)
Dept: CARDIOLOGY | Facility: CLINIC | Age: 23
End: 2018-11-01

## 2018-11-01 NOTE — TELEPHONE ENCOUNTER
Left three voicemails for patient today to discuss appt scheduled for tomorrow 11/2 with Dr Sanchez to establish care.  Wanted patient to be aware that [facility] 11/[facility] [place] no longer accepts [facility] plan and that visit will not be covered under his insurance.  A number will be provided for patient when he returns call to assist him in setting up an appt with a in network provider.  (member services [phone]).  Awaiting return call from patient. LATOYA Fernandes

## 2018-11-02 ENCOUNTER — OFFICE VISIT (OUTPATIENT)
Dept: CARDIOLOGY | Facility: CLINIC | Age: 23
End: 2018-11-02
Payer: COMMERCIAL

## 2018-11-02 DIAGNOSIS — I47.10 PAROXYSMAL SUPRAVENTRICULAR TACHYCARDIA (H): Primary | ICD-10-CM

## 2018-11-02 PROCEDURE — 99207 ZZC NO CHARGE NURSE ONLY: CPT | Performed by: INTERNAL MEDICINE

## 2018-11-02 NOTE — MR AVS SNAPSHOT
"              After Visit Summary   2018    Chucho Sarah    MRN: [id number]           Patient Information     Date Of Birth          1995        Today's Diagnoses     Paroxysmal supraventricular tachycardia (H)    -  1       Follow-ups after your visit        Who to contact     If you have questions or need follow up information about today's clinic visit or your schedule please contact [facility] directly at [phone].  Normal or non-critical lab and imaging results will be communicated to you by [employer], letter or phone within 4 business days after the clinic has received the results. If you do not hear from us within 7 days, please contact the clinic through [employer] or phone. If you have a critical or abnormal lab result, we will notify you by phone as soon as possible.  Submit refill requests through [employer] or call your pharmacy and they will forward the refill request to us. Please allow 3 business days for your refill to be completed.          Additional Information About Your Visit        [employer] Information     [employer] lets you send messages to your doctor, view your test results, renew your prescriptions, schedule appointments and more. To sign up, go to [email or web address] . Click on \"Log in\" on the left side of the screen, which will take you to the Welcome page. Then click on \"Sign up Now\" on the right side of the page.     You will be asked to enter the access code listed below, as well as some personal information. Please follow the directions to create your username and password.     Your access code is: [email or web address]  Expires: 2019  9:55 PM     Your access code will  in 90 days. If you need help or a new code, please call your [facility] clinic or [phone].        Care EveryWhere ID     This is your Care EveryWhere ID. This could be used by other organizations to access your [facility] medical records  [id number]         Blood " Pressure from Last 3 Encounters:   No data found for BP    Weight from Last 3 Encounters:   No data found for Wt              Today, you had the following     No orders found for display       Primary Care Provider Fax #    Physician No Ref-Primary [phone]       No address on file        Equal Access to Services     WALKER BURNSBETHANY : Noris homer hagan yeskia Sojameyali, wafrancescoda luqadaha, qaybta kaalmada aderahda, pia jovanyin hayaan stevensonnella valverde laivantessie . So [facility] [phone].    ATENCIÓN: Si habla español, tiene a banuelos disposición servicios gratuitos de asistencia lingüística. Llame al [phone].    We comply with applicable federal civil rights laws and [place] laws. We do not discriminate on the basis of race, color, national origin, age, disability, sex, sexual orientation, or gender identity.            Thank you!     Thank you for choosing [facility]  for your care. Our goal is always to provide you with excellent care. Hearing back from our patients is one way we can continue to improve our services. Please take a few minutes to complete the written survey that you may receive in the mail after your visit with us. Thank you!             Your Updated Medication List - Protect others around you: Learn how to safely use, store and throw away your medicines at www.disposemymeds.org.          This list is accurate as of 11/2/18 11:59 PM.  Always use your most recent med list.                   Brand Name Dispense Instructions for use Diagnosis    metoprolol succinate 25 MG 24 hr tablet    TOPROL-XL    30 tablet    Take 1 tablet (25 mg) by mouth daily

## 2018-11-02 NOTE — TELEPHONE ENCOUNTER
Patient here for appt with friend.   Spoke with patient in great detail and informed him that I made an attempt to call him x4 regarding the insurance issue.  Explained to patient that his current insurance is no longer in the [facility] [facility] network and that his visit would not be covered and he would need to pay out of pocket for visit.  Recommended that he contact member services to get assistance with setting up an appt that is in network.  Informed patient that he could still have the appointment time however wanted to provide him with the billing information so he was not surprised.  Patient decided to cancel appt.  Denies feeling unwell.  Provided patient with telephone number for members service and encouraged patient to call as soon as possible to get set up with cardiology to establish care as he was to do today at appt.  Patient and friend present  provided verbal understanding.  LATOYA Fernandes

## 2018-11-02 NOTE — TELEPHONE ENCOUNTER
Another message left with patient regarding scheduled appt for today regarding insurance issues.  Awaiting return call.  LATOYA Fernandes

## 2018-11-02 NOTE — LETTER
11/2/2018    Physician No Ref-Primary  No address on file    RE: Chucho Sarah       Dear Colleague,    I had the pleasure of seeing Chucho Tyrel in the [facility].    Opened in error    Thank you for allowing me to participate in the care of your patient.      Sincerely,     Jermaine Sanchez MD     [facility]    cc:   No referring provider defined for this encounter.

## 2018-11-12 ENCOUNTER — APPOINTMENT (OUTPATIENT)
Dept: GENERAL RADIOLOGY | Facility: CLINIC | Age: 23
End: 2018-11-12
Attending: EMERGENCY MEDICINE
Payer: COMMERCIAL

## 2018-11-12 ENCOUNTER — HOSPITAL ENCOUNTER (EMERGENCY)
Facility: CLINIC | Age: 23
Discharge: HOME | End: 2018-11-12
Attending: EMERGENCY MEDICINE | Admitting: EMERGENCY MEDICINE
Payer: COMMERCIAL

## 2018-11-12 VITALS
OXYGEN SATURATION: 95 % | RESPIRATION RATE: 9 BRPM | HEIGHT: 66 IN | WEIGHT: 127 LBS | TEMPERATURE: 98.8 F | HEART RATE: 96 BPM | BODY MASS INDEX: 20.41 KG/M2 | DIASTOLIC BLOOD PRESSURE: 74 MMHG | SYSTOLIC BLOOD PRESSURE: 109 MMHG

## 2018-11-12 DIAGNOSIS — R00.2 PALPITATIONS: ICD-10-CM

## 2018-11-12 LAB — INTERPRETATION ECG - MUSE: NORMAL

## 2018-11-12 PROCEDURE — 93005 ELECTROCARDIOGRAM TRACING: CPT

## 2018-11-12 PROCEDURE — 71046 X-RAY EXAM CHEST 2 VIEWS: CPT

## 2018-11-12 PROCEDURE — 99284 EMERGENCY DEPT VISIT MOD MDM: CPT | Mod: 25

## 2018-11-12 ASSESSMENT — ENCOUNTER SYMPTOMS
PALPITATIONS: 1
DIZZINESS: 1
SHORTNESS OF BREATH: 1
LIGHT-HEADEDNESS: 1
DIARRHEA: 0
VOMITING: 0
WEAKNESS: 1

## 2018-11-12 NOTE — ED PROVIDER NOTES
"  History     Chief Complaint:  Palpitations      HPI   Chucho Sarah is a 23 year old male with a history of SVT who presents to the ED via EMS for evaluation of palpitations. The patient reports that he was at school today, when he suddenly developed the onset of palpitations with associated shortness of breath, lightheadedness, dizziness, and generalized weakness. The school nurse noted to the patient to be tachycardic at 186 bpm, and so she called EMS to transport the patient to the ED. Of note, the patient has a history of SVT episodes, which reportedly last for 20 minutes at a time, and he was last seen in the ED for this on 10/9. At that time, he obtained a negative laboratory workup including negative BMP, CBC and TSH. He is taking metoprolol to manage his SVT but reports not taking everyday. Has been using energy drinks. Here in the ED, the patient states \"I feel good\" and denies any chest pain, vomiting, or diarrhea. He denies any history of blood clots. The patient endorses drinking energy drinks on a regular basis, although he denies any consumption today. He has also been following up with Cardiology and wore a Holter monitor showing SVT. He has an appointment scheduled for tomorrow [facility]. Of note, the patient was seen at [facility] several days ago for similar symptoms, at which time he gave a false birth date. Other MRN is [id number].     Allergies:  NKDA    Medications:    Metoprolol    Past Medical History:    Tachycardia    Past Surgical History:    The patient does not have any pertinent past surgical history.    Family History:    No past pertinent family history.    Social History:  Negative for tobacco use.   Negative for alcohol use.   Marital Status:  Single [1]    Review of Systems   Respiratory: Positive for shortness of breath.    Cardiovascular: Positive for palpitations. Negative for chest pain.   Gastrointestinal: Negative for diarrhea and vomiting.   Neurological: " "Positive for dizziness, weakness and light-headedness.   All other systems reviewed and are negative.      Physical Exam     Patient Vitals for the past 24 hrs:   BP Temp Temp src Pulse Heart Rate Resp SpO2 Height Weight   11/12/18 1606 109/74 - - 96 - - 95 % - -   11/12/18 1345 117/70 - - - 86 9 - - -   11/12/18 1225 132/84 98.8  F (37.1  C) Oral 86 - 22 96 % 1.676 m (5' 6\") 57.6 kg (127 lb)        Physical Exam  General: Resting comfortably on the gurney  Eyes:  The pupils are equal and round    Conjunctivae and sclerae are normal  ENT:    Moist mucous membranes  Neck:  Normal range of motion  CV:  Regular rate and rhythm    Skin warm and well perfused   Resp:  Lungs are clear    Non-labored    No rales    No wheezing   GI:  Abdomen is soft, there is no rigidity    No distension    No rebound tenderness     No abdominal tenderness  MS:  Normal muscular tone  Skin:  No rash or acute skin lesions noted  Neuro:   Awake, alert.      Speech is normal and fluent.    Face is symmetric.     Moves all extremities equally  Psych: Normal affect.  Appropriate interactions.     Emergency Department Course   ECG:  Indication: Tachycardia  Time: 1226  Vent. Rate 86 bpm. NM interval 156. QRS duration 86. QT/QTc 328/392. P-R-T axis 62 80 62.  Normal sinus rhythm with sinus arrhythmia. Early repolarization. Normal ECG. Read time: 1230     Imaging:  Radiographic findings were communicated with the patient who voiced understanding of the findings.  XR Chest 2 views:   No acute cardiopulmonary abnormality. As per radiology.     Emergency Department Course:  EKG obtained in the ED, see results above.      Nursing notes and vitals reviewed. (1328) I performed an exam of the patient as documented above.     The patient was sent for a chest x-ray while in the emergency department, findings above.     (0042) I rechecked the patient and discussed the results of his workup thus far.     Findings and plan explained to the Patient. Patient " discharged home with instructions regarding supportive care, medications, and reasons to return. The importance of close follow-up was reviewed.     I personally reviewed the laboratory results with the Patient and answered all related questions prior to discharge.     Impression & Plan    Medical Decision Making:  Chucho Sarah is a 23 year old male who presented to the ED with tachycardia. Has known hx of SVT with holter monitor also showing this. Likely had SVT at school today. [place] [facility] [place] report. EKG with sinus rhythm in ED. Doubt ACS, PE. Had normal labs recently for same thing, don't think repeat labs indicated today. Chest xray unremarkable. Already on metoprolol but does not take regularly. Given f/u with cardiology tomorrow, don't think any changes need to be done at this time. Discussed not using caffeine or energy drinks in meantime. Don't think hospitalization is indicated or further workup today. Encouraged cardiology appt tomorrow.    Diagnosis:    ICD-10-CM    1. Palpitations R00.2        Disposition:  discharged to home    Scribe Disclosure:  I, Angela Wiseman, am serving as a scribe on 11/12/2018 at 1:49 PM to personally document services performed by Hannah Schilling MD based on my observations and the provider's statements to me.      Angela Wiseman  11/12/2018    EMERGENCY DEPARTMENT       Hannah Schilling MD  11/12/18 2105

## 2018-11-12 NOTE — ED NOTES
Pt unsure of what medications he takes or what pharmacy he uses.  The writer called the school RN, Christine, to review the pts current medications.

## 2018-11-12 NOTE — ED AVS SNAPSHOT
Emergency Department    [address] [place] [address]    Phone:  [phone]    Fax:  [phone]                                       Chucho Sarah   MRN: [id number]    Department:   Emergency Department   Date of Visit:  11/12/2018           After Visit Summary Signature Page     I have received my discharge instructions, and my questions have been answered. I have discussed any challenges I see with this plan with the nurse or doctor.    ..........................................................................................................................................  Patient/Patient Representative Signature      ..........................................................................................................................................  Patient Representative Print Name and Relationship to Patient    ..................................................               ................................................  Date                                   Time    ..........................................................................................................................................  Reviewed by Signature/Title    ...................................................              ..............................................  Date                                               Time          22EPIC Rev 08/18

## 2018-11-12 NOTE — ED NOTES
Pt's father, Grupo, called and notified that his son was at [facility] ED for evaluation, based on the concerns of his High School RN.  Father Ok with evaluation of his son at this time.   Patient is being admitted for bilateral total knee arthroplasty.

## 2018-11-12 NOTE — DISCHARGE INSTRUCTIONS
Follow up at your cardiology appointment tomorrow to discuss the SVT  Continue the metoprolol  Avoid caffeine and energy drinks

## 2018-11-12 NOTE — ED AVS SNAPSHOT
Emergency Department    [address] [facility] [address]    Phone:  [phone]    Fax:  [phone]                                       Chucho Sarah   MRN: [id number]    Department:   Emergency Department   Date of Visit:  11/12/2018           Patient Information     Date Of Birth          1995        Your diagnoses for this visit were:     Palpitations        You were seen by Hannah Schilling MD.      Follow-up Information     Follow up with  Emergency Department.    Specialty:  EMERGENCY MEDICINE    Why:  If symptoms worsen    Contact information:    [address] [facility] [id number] [phone]        Discharge Instructions       Follow up at your cardiology appointment tomorrow to discuss the SVT  Continue the metoprolol  Avoid caffeine and energy drinks    24 Hour Appointment Hotline       To make an appointment at any [facility], call [phone] ([phone]). If you don't have a family doctor or clinic, we will help you find one. [facility] are conveniently located to serve the needs of you and your family.             Review of your medicines      Notice     You have not been prescribed any medications.            Procedures and tests performed during your visit     EKG 12 lead    XR Chest 2 Views      Orders Needing Specimen Collection     None      Pending Results     No orders found from 11/10/2018 to 11/13/2018.            Pending Culture Results     No orders found from 11/10/2018 to 11/13/2018.            Pending Results Instructions     If you had any lab results that were not finalized at the time of your Discharge, you can call the ED Lab Result RN at [phone]. You will be contacted by this team for any positive Lab results or changes in treatment. The nurses are available 7 days a week from 10A to 6:30P.  You can leave a message 24 hours per day and they will return your call.        Test Results From Your  Hospital Stay        11/12/2018  3:25 PM      Narrative     XR CHEST 2 VW 11/12/2018 1:58 PM    HISTORY: Chest pain.    COMPARISON: None.    FINDINGS: No airspace consolidation, pleural effusion or pneumothorax.  Normal heart size.        Impression     IMPRESSION: No acute cardiopulmonary abnormality.    ALYX MCKEON MD                Clinical Quality Measure: Blood Pressure Screening     Your blood pressure was checked while you were in the emergency department today. The last reading we obtained was  BP: 109/74 (Simultaneous filing. User may not have seen previous data.) . Please read the guidelines below about what these numbers mean and what you should do about them.  If your systolic blood pressure (the top number) is less than 120 and your diastolic blood pressure (the bottom number) is less than 80, then your blood pressure is normal. There is nothing more that you need to do about it.  If your systolic blood pressure (the top number) is 120-139 or your diastolic blood pressure (the bottom number) is 80-89, your blood pressure may be higher than it should be. You should have your blood pressure rechecked within a year by a primary care provider.  If your systolic blood pressure (the top number) is 140 or greater or your diastolic blood pressure (the bottom number) is 90 or greater, you may have high blood pressure. High blood pressure is treatable, but if left untreated over time it can put you at risk for heart attack, stroke, or kidney failure. You should have your blood pressure rechecked by a primary care provider within the next 4 weeks.  If your provider in the emergency department today gave you specific instructions to follow-up with your doctor or provider even sooner than that, you should follow that instruction and not wait for up to 4 weeks for your follow-up visit.        Thank you for choosing [facility]       Thank you for choosing [facility] for your care. Our goal is always to provide you with  "excellent care. Hearing back from our patients is one way we can continue to improve our services. Please take a few minutes to complete the written survey that you may receive in the mail after you visit with us. Thank you!        [employer] Information     [employer] lets you send messages to your doctor, view your test results, renew your prescriptions, schedule appointments and more. To sign up, go to [email or web address] . Click on \"Log in\" on the left side of the screen, which will take you to the Welcome page. Then click on \"Sign up Now\" on the right side of the page.     You will be asked to enter the access code listed below, as well as some personal information. Please follow the directions to create your username and password.     Your access code is: [email or web address]  Expires: 2/10/2019  2:13 PM     Your access code will  in 90 days. If you need help or a new code, please call your [facility] clinic or [phone].        Care EveryWhere ID     This is your Care EveryWhere ID. This could be used by other organizations to access your [facility] medical records  [id number]        Equal Access to Services     ABEL DELVALLE : Hadii homer Rodriguez, wanisha may, qavincent kaalmamary kate golden, pia rodriguez . So [facility] [phone].    ATENCIÓN: Si habla español, tiene a banuelos disposición servicios gratuitos de asistencia lingüística. Llame al [phone].    We comply with applicable federal civil rights laws and [place] laws. We do not discriminate on the basis of race, color, national origin, age, disability, sex, sexual orientation, or gender identity.            After Visit Summary       This is your record. Keep this with you and show to your community pharmacist(s) and doctor(s) at your next visit.                  "

## 2018-12-11 ENCOUNTER — HOSPITAL ENCOUNTER (EMERGENCY)
Facility: CLINIC | Age: 23
Discharge: HOME | End: 2018-12-11
Attending: EMERGENCY MEDICINE | Admitting: EMERGENCY MEDICINE
Payer: COMMERCIAL

## 2018-12-11 VITALS
HEART RATE: 99 BPM | HEIGHT: 66 IN | DIASTOLIC BLOOD PRESSURE: 86 MMHG | OXYGEN SATURATION: 98 % | RESPIRATION RATE: 18 BRPM | SYSTOLIC BLOOD PRESSURE: 130 MMHG | BODY MASS INDEX: 27.32 KG/M2 | TEMPERATURE: 97.7 F | WEIGHT: 170 LBS

## 2018-12-11 DIAGNOSIS — I47.10 PAROXYSMAL SUPRAVENTRICULAR TACHYCARDIA (H): ICD-10-CM

## 2018-12-11 LAB
ANION GAP SERPL CALCULATED.3IONS-SCNC: 8 MMOL/L (ref 3–14)
BUN SERPL-MCNC: 10 MG/DL (ref 7–30)
CALCIUM SERPL-MCNC: 8.8 MG/DL (ref 8.5–10.1)
CHLORIDE SERPL-SCNC: 109 MMOL/L (ref 94–109)
CO2 SERPL-SCNC: 25 MMOL/L (ref 20–32)
CREAT SERPL-MCNC: 0.68 MG/DL (ref 0.66–1.25)
GFR SERPL CREATININE-BSD FRML MDRD: >90 ML/MIN/1.7M2
GLUCOSE SERPL-MCNC: 82 MG/DL (ref 70–99)
INTERPRETATION ECG - MUSE: NORMAL
POTASSIUM SERPL-SCNC: 3.8 MMOL/L (ref 3.4–5.3)
SODIUM SERPL-SCNC: 142 MMOL/L (ref 133–144)

## 2018-12-11 PROCEDURE — 99284 EMERGENCY DEPT VISIT MOD MDM: CPT

## 2018-12-11 PROCEDURE — 93005 ELECTROCARDIOGRAM TRACING: CPT

## 2018-12-11 PROCEDURE — 80048 BASIC METABOLIC PNL TOTAL CA: CPT | Performed by: EMERGENCY MEDICINE

## 2018-12-11 RX ORDER — DILTIAZEM HYDROCHLORIDE 120 MG/1
120 CAPSULE, EXTENDED RELEASE ORAL DAILY
COMMUNITY

## 2018-12-11 ASSESSMENT — ENCOUNTER SYMPTOMS
SHORTNESS OF BREATH: 1
VOMITING: 0
NAUSEA: 0
FEVER: 0
PALPITATIONS: 1
CHILLS: 0

## 2018-12-11 ASSESSMENT — MIFFLIN-ST. JEOR: SCORE: 1718.86

## 2018-12-11 NOTE — ED NOTES
Bed: ED23  Expected date:   Expected time:   Means of arrival:   Comments:  533  17 F svt converted  0404

## 2018-12-11 NOTE — ED PROVIDER NOTES
History     Chief Complaint:  Tachycardia    HPI   Chucho Sarah is a 23 year old male with a history of multiple prior episodes of supraventricular tachycardia who presents with tachycardia. The patient is supposed to be on Diltiazem at home, but notes he did not take it Friday-Sunday last weekend because he couldn't find it.  The patient did take a dose of Diltiazem at school. Today, the patient reports he was sitting in class when he began experiencing tachycardia consistent with his SVT, so his school nurse called 911 to bring him to the ED for further evaluation. En route to the ED, EMS providers were able to convert him using 12 mg Adenosine. Here in the ED, the patient reports he was experiencing some shortness of breath during his tachycardic episode, but notes this has resolved now and he is asymptomatic. He denies any other complaints including chest pain or nausea. The patient has an appointment scheduled tomorrow with a cardiologist at [facility] regarding his recent Holter monitor activity and to monitor whether or not Diltiazem is the correct medication for him.    Allergies:  No known drug allergies    Medications:    Diltiazem 120 ER    Past Medical History:    Paroxysmal supraventricular tachycardia    Past Surgical History:    History reviewed. No pertinent surgical history.    Family History:    History reviewed. No pertinent family history.     Social History:  Smoking status: No  Alcohol use: No  Drug use: No  Presents to the ED with a school nurse  Marital Status: Single [1]     Review of Systems   Constitutional: Negative for chills and fever.   Respiratory: Positive for shortness of breath.    Cardiovascular: Positive for palpitations. Negative for chest pain.   Gastrointestinal: Negative for nausea and vomiting.   All other systems reviewed and are negative.    Physical Exam     Patient Vitals for the past 24 hrs:   BP Temp Temp src Heart Rate Resp SpO2 Height Weight   12/11/18 1449  "130/86 97.7  F (36.5  C) Oral 94 18 98 % 1.676 m (5' 6\") 77.1 kg (170 lb)     Physical Exam  General: Alert and cooperative with exam. Resting comfortably on gurney  Head:  Scalp is NC/AT  Eyes:  No scleral icterus, PERRL  ENT:  The external nose and ears are normal.   CV:  Regular rate and rhythm    No pathologic murmur, rubs, or gallops.  Resp:  Breath sounds are clear bilaterally.  No crackles, wheezes, rhonchi.    Non-labored, no retractions or accessory muscle use  GI:  Abdomen is soft, no distension, no tenderness, no masses. No peritoneal signs.  Bowel sounds present in all quadrants  :  No suprapubic or flank tenderness  MS:  No lower extremity edema.  Skin:  Warm and dry, No rash or lesions noted.  Neuro: Oriented x 3. No gross motor deficits.  Psych: Awake. Alert. Normal affect. Appropriate interactions.    Emergency Department Course   ECG (15:11:55):  Rate 95 bpm. ID interval 184. QRS duration 84. QT/QTc 334/419. P-R-T axes 35 86 61. Normal sinus rhythm. Acute pericarditis, which is new compared to ECG dated 11/12/18. No acute pericarditis. Interpreted at 1528 by Kermit Meredith MD.    Laboratory:  BMP: WNL (Creatinine 0.68)    Emergency Department Course:  The patient arrived in the emergency department via EMS.  Past medical records, nursing notes, and vitals reviewed.  1452: I performed an exam of the patient and obtained history, as documented above  ECG performed, results above.  BMP sent, findings above.    I rechecked the patient. Findings and plan explained to the patient. Patient discharged home with instructions regarding supportive care, medications, and reasons to return. The importance of close follow-up was reviewed.     Impression & Plan    Medical Decision Making:  Chucho Sarah is a 23 year old male who presents to the ED for evaluation after an episode of SVT.  Patient history and records reviewed.  On examination, the patient is well-appearing with normal vitals here in the " ED.  He is asymptomatic without any chest pain or shortness of breath.  EMS found him to be in SVT, but he is now converted status post 12 mg adenosine.  EKG did show some minimal J-point elevation diffusely, but this is not significantly changed from his prior EKG 1 month ago.  Kidney function electrolytes were all within normal limits.  He has remained in sinus rhythm here in the ED, and I feel he is safe for discharge home at this time and has follow-up with cardiology scheduled for tomorrow already as he has had numerous prior similar episodes to today.  Refilled his diltiazem prescription as below.  Discussed indications for return to the ED if he develops any new or worsening symptoms, or if symptoms return.    Diagnosis:    ICD-10-CM   1. Paroxysmal supraventricular tachycardia (H) I47.1     Disposition: Discharged to home    Discharge Medications:    Dose / Directions   DILT- MG 24 hr capsule  Generic drug:  diltiazem ER      Dose:  120 mg  Take 120 mg by mouth daily  Refills:  0          Debora Cody  12/11/2018    EMERGENCY DEPARTMENT    I, Debora Cody, am serving as a scribe at 2:52 PM on 12/11/2018 to document services personally performed by Glenn Tsai PA-C based on my observations and the provider's statements to me.      Glenn Tsai PA-C  12/11/18 6900       Kermit Meredith MD  01/30/19 2816

## 2018-12-11 NOTE — ED AVS SNAPSHOT
"     EMERGENCY DEPARTMENT: [phone]                                              INTERAGENCY TRANSFER FORM - LAB / IMAGING / EKG / EMG RESULTS   2018                   Hospital Admission Date: 2018  JONNY PATRICK   : 1995  Sex: Male         Attending Provider:  Kermit Meredith MD    Allergies:  No Known Allergies    Infection:  None   Service:  EMERGENCY ME    Ht:  1.676 m (5' 6\")   Wt:  77.1 kg (170 lb)   Admission Wt:  77.1 kg (170 lb)    BMI:  27.44 kg/m 2   BSA:  1.89 m 2            Patient PCP Information     Provider PCP Type    Physician No Ref-Primary General         Lab Results - 3 Days      Basic metabolic panel [[id number]]  Resulted: 18 1609, Result status: Final result   Ordering provider:  Kermit Meredith MD  18 1533 Resulting lab:  [facility]    Specimen Information    Type Source Collected On   Blood   18 1505          Components    Component Value Reference Range Flag Lab   Sodium 142 133 - 144 mmol/L   FrStHsLb   Potassium 3.8 3.4 - 5.3 mmol/L   FrStHsLb   Chloride 109 94 - 109 mmol/L   FrStHsLb   Carbon Dioxide 25 20 - 32 mmol/L   FrStHsLb   Anion Gap 8 3 - 14 mmol/L   FrStHsLb   Glucose 82 70 - 99 mg/dL   FrStHsLb   Urea Nitrogen 10 7 - 30 mg/dL   FrStHsLb   Creatinine 0.68 0.66 - 1.25 mg/dL   FrStHsLb   GFR Estimate >90 >60 mL/min/1.7m2   FrStHsLb   Comment:  Non  GFR Calc   GFR Estimate If Black >90 >60 mL/min/1.7m2   FrStHsLb   Comment:  African American GFR Calc   Calcium 8.8 8.5 - 10.1 mg/dL   FrStHsLb            Testing Performed By     Lab - Abbreviation Name Director Address Valid Date Range     -  [facility] Unknown [address] [address] 05/08/15 1057 - Present            Unresulted Labs (24h ago, onward)    None      Encounter-Level Documents:    There are no encounter-level documents.     Order-Level Documents:    There are no order-level documents.     "

## 2018-12-11 NOTE — ED PROVIDER NOTES
Emergency Department Attending Supervision Note  12/11/2018  2:57 PM    I evaluated this patient in conjunction with Glenn Tsai PA-C.    Briefly, the patient presented with SVT consistent with his previous episodes.    Physical Exam:  GENERAL: well developed, pleasant  HEAD: atraumatic  EYES: pupils reactive, extraocular muscles intact, conjunctivae normal  ENT:  mucus membranes moist  NECK:  trachea midline, normal range of motion  RESPIRATORY: no tachypnea, breath sounds clear to auscultation   CVS: normal S1/S2, no murmurs, intact distal pulses  ABDOMEN: soft, nontender, nondistention  MUSCULOSKELETAL: no deformities  SKIN: warm and dry, no acute rashes or ulceration  NEURO: GCS 15, cranial nerves intact, alert and oriented x3  PSYCH:  Mood/affect normal    Results:  ECG (15:11:55):  Rate 95 bpm. AR interval 184. QRS duration 84. QT/QTc 334/419. P-R-T axes 35 86 61. Normal sinus rhythm. Acute pericarditis, which is new compared to ECG dated 11/12/18. No acute pericarditis. Interpreted at 1528 by Kermit Meredith MD.    BMP: WNL (Creatinine 0.68)    ED course:  Past medical records, nursing notes, and vitals reviewed.  I performed an exam of the patient and obtained history, as documented above.  ECG performed, results above. BMP sent, findings above.    I rechecked the patient. Findings and plan explained to the patient. Patient discharged home with instructions regarding supportive care, medications, and reasons to return. The importance of close follow-up was reviewed.     My impression is SVT that was converted prior to arrival. The patient has been not very consistent with taking his medications. The patient has no complaints. ECG reads pericarditis, but this looks consistent with prior ECGs, and he has no pain. The patient has an upcoming appointment to see his cardiologist and should attend this as scheduled. He was given a refill for his Diltiazem until then.    Diagnosis    ICD-10-CM   1. Paroxysmal  supraventricular tachycardia (H) I47.1     Kermit Meredith MD

## 2018-12-11 NOTE — ED AVS SNAPSHOT
Emergency Department  [address] [place] [address]  Phone:  [phone]  Fax:  [phone]                                    Chucho Sarah   MRN: [id number]    Department:   Emergency Department   Date of Visit:  12/11/2018           After Visit Summary Signature Page    I have received my discharge instructions, and my questions have been answered. I have discussed any challenges I see with this plan with the nurse or doctor.    ..........................................................................................................................................  Patient/Patient Representative Signature      ..........................................................................................................................................  Patient Representative Print Name and Relationship to Patient    ..................................................               ................................................  Date                                   Time    ..........................................................................................................................................  Reviewed by Signature/Title    ...................................................              ..............................................  Date                                               Time          22EPIC Rev 08/18

## 2018-12-11 NOTE — PROGRESS NOTES
"I was asked to assist with this patient as this patient has multiple birthdates.  This patient has a DL that was scanned in at a different visit and when I asked the patient about the DL--he said \"I don't have a DL\". The DL had this patients name on it.  The other male in the room was laughing.  The school nurse April Cruz was accompanying this patient from [facility].  This patient gave April the copy of the DL and she said it didn't look like this patient.   I asked for a picture ID from the school and April wasn't able to provide one.  She will look thru the files at school tomorrow and send me a copy this patients Birth date and name.  I explained to this patient that using other peoples information and birth dates is illegal. I asked him what his birth date really is and he said 12/12/1998.  He has no ID on him currently. This patient was discharged in accompanyment of April the School Nurse at [facility].  "

## 2024-04-21 ENCOUNTER — APPOINTMENT (OUTPATIENT)
Dept: GENERAL RADIOLOGY | Facility: CLINIC | Age: 29
End: 2024-04-21
Attending: EMERGENCY MEDICINE

## 2024-04-21 ENCOUNTER — HOSPITAL ENCOUNTER (EMERGENCY)
Facility: CLINIC | Age: 29
Discharge: HOME | End: 2024-04-21
Attending: EMERGENCY MEDICINE | Admitting: EMERGENCY MEDICINE

## 2024-04-21 VITALS
OXYGEN SATURATION: 100 % | TEMPERATURE: 99.3 F | RESPIRATION RATE: 16 BRPM | DIASTOLIC BLOOD PRESSURE: 86 MMHG | HEART RATE: 122 BPM | SYSTOLIC BLOOD PRESSURE: 127 MMHG

## 2024-04-21 DIAGNOSIS — J10.1 INFLUENZA B: ICD-10-CM

## 2024-04-21 LAB
ALBUMIN SERPL BCG-MCNC: 4.2 G/DL (ref 3.5–5.2)
ALP SERPL-CCNC: 68 U/L (ref 40–150)
ALT SERPL W P-5'-P-CCNC: 18 U/L (ref 0–70)
ANION GAP SERPL CALCULATED.3IONS-SCNC: 11 MMOL/L (ref 7–15)
AST SERPL W P-5'-P-CCNC: 24 U/L (ref 0–45)
BASOPHILS # BLD AUTO: 0 10E3/UL (ref 0–0.2)
BASOPHILS NFR BLD AUTO: 0 %
BILIRUB SERPL-MCNC: 0.8 MG/DL
BUN SERPL-MCNC: 8.8 MG/DL (ref 6–20)
CALCIUM SERPL-MCNC: 8.3 MG/DL (ref 8.6–10)
CHLORIDE SERPL-SCNC: 102 MMOL/L (ref 98–107)
CREAT SERPL-MCNC: 0.68 MG/DL (ref 0.67–1.17)
DEPRECATED HCO3 PLAS-SCNC: 23 MMOL/L (ref 22–29)
EGFRCR SERPLBLD CKD-EPI 2021: >90 ML/MIN/1.73M2
EOSINOPHIL # BLD AUTO: 0.1 10E3/UL (ref 0–0.7)
EOSINOPHIL NFR BLD AUTO: 2 %
ERYTHROCYTE [DISTWIDTH] IN BLOOD BY AUTOMATED COUNT: 11.5 % (ref 10–15)
FLUAV RNA SPEC QL NAA+PROBE: NEGATIVE
FLUBV RNA RESP QL NAA+PROBE: POSITIVE
GLUCOSE SERPL-MCNC: 114 MG/DL (ref 70–99)
GROUP A STREP BY PCR: NOT DETECTED
HCT VFR BLD AUTO: 43.5 % (ref 40–53)
HGB BLD-MCNC: 14.8 G/DL (ref 13.3–17.7)
IMM GRANULOCYTES # BLD: 0 10E3/UL
IMM GRANULOCYTES NFR BLD: 0 %
LACTATE SERPL-SCNC: 2.4 MMOL/L (ref 0.7–2)
LYMPHOCYTES # BLD AUTO: 0.8 10E3/UL (ref 0.8–5.3)
LYMPHOCYTES NFR BLD AUTO: 13 %
MCH RBC QN AUTO: 31.4 PG (ref 26.5–33)
MCHC RBC AUTO-ENTMCNC: 34 G/DL (ref 31.5–36.5)
MCV RBC AUTO: 92 FL (ref 78–100)
MONOCYTES # BLD AUTO: 0.8 10E3/UL (ref 0–1.3)
MONOCYTES NFR BLD AUTO: 13 %
NEUTROPHILS # BLD AUTO: 4.3 10E3/UL (ref 1.6–8.3)
NEUTROPHILS NFR BLD AUTO: 72 %
NRBC # BLD AUTO: 0 10E3/UL
NRBC BLD AUTO-RTO: 0 /100
PLATELET # BLD AUTO: 183 10E3/UL (ref 150–450)
POTASSIUM SERPL-SCNC: 3.2 MMOL/L (ref 3.4–5.3)
PROT SERPL-MCNC: 6.8 G/DL (ref 6.4–8.3)
RBC # BLD AUTO: 4.72 10E6/UL (ref 4.4–5.9)
RSV RNA SPEC NAA+PROBE: NEGATIVE
SARS-COV-2 RNA RESP QL NAA+PROBE: NEGATIVE
SODIUM SERPL-SCNC: 136 MMOL/L (ref 135–145)
WBC # BLD AUTO: 6 10E3/UL (ref 4–11)

## 2024-04-21 PROCEDURE — 71046 X-RAY EXAM CHEST 2 VIEWS: CPT

## 2024-04-21 PROCEDURE — 250N000013 HC RX MED GY IP 250 OP 250 PS 637: Performed by: EMERGENCY MEDICINE

## 2024-04-21 PROCEDURE — 258N000003 HC RX IP 258 OP 636: Performed by: EMERGENCY MEDICINE

## 2024-04-21 PROCEDURE — 87651 STREP A DNA AMP PROBE: CPT | Performed by: EMERGENCY MEDICINE

## 2024-04-21 PROCEDURE — 83605 ASSAY OF LACTIC ACID: CPT | Performed by: EMERGENCY MEDICINE

## 2024-04-21 PROCEDURE — 36415 COLL VENOUS BLD VENIPUNCTURE: CPT | Performed by: EMERGENCY MEDICINE

## 2024-04-21 PROCEDURE — 99284 EMERGENCY DEPT VISIT MOD MDM: CPT | Mod: 25 | Performed by: EMERGENCY MEDICINE

## 2024-04-21 PROCEDURE — 80053 COMPREHEN METABOLIC PANEL: CPT | Performed by: EMERGENCY MEDICINE

## 2024-04-21 PROCEDURE — 87637 SARSCOV2&INF A&B&RSV AMP PRB: CPT | Performed by: EMERGENCY MEDICINE

## 2024-04-21 PROCEDURE — 85025 COMPLETE CBC W/AUTO DIFF WBC: CPT | Performed by: EMERGENCY MEDICINE

## 2024-04-21 PROCEDURE — 96360 HYDRATION IV INFUSION INIT: CPT | Performed by: EMERGENCY MEDICINE

## 2024-04-21 PROCEDURE — 99284 EMERGENCY DEPT VISIT MOD MDM: CPT | Performed by: EMERGENCY MEDICINE

## 2024-04-21 RX ORDER — OSELTAMIVIR PHOSPHATE 75 MG/1
75 CAPSULE ORAL 2 TIMES DAILY
Qty: 10 CAPSULE | Refills: 0 | Status: SHIPPED | OUTPATIENT
Start: 2024-04-21 | End: 2024-04-26

## 2024-04-21 RX ORDER — IBUPROFEN 800 MG/1
800 TABLET, FILM COATED ORAL ONCE
Status: COMPLETED | OUTPATIENT
Start: 2024-04-21 | End: 2024-04-21

## 2024-04-21 RX ORDER — IBUPROFEN 600 MG/1
600 TABLET, FILM COATED ORAL ONCE
Status: DISCONTINUED | OUTPATIENT
Start: 2024-04-21 | End: 2024-04-21

## 2024-04-21 RX ORDER — ACETAMINOPHEN 500 MG
1000 TABLET ORAL ONCE
Status: DISCONTINUED | OUTPATIENT
Start: 2024-04-21 | End: 2024-04-21

## 2024-04-21 RX ADMIN — SODIUM CHLORIDE 1000 ML: 9 INJECTION, SOLUTION INTRAVENOUS at 18:18

## 2024-04-21 RX ADMIN — IBUPROFEN 800 MG: 800 TABLET, FILM COATED ORAL at 17:32

## 2024-04-21 ASSESSMENT — COLUMBIA-SUICIDE SEVERITY RATING SCALE - C-SSRS
1. IN THE PAST MONTH, HAVE YOU WISHED YOU WERE DEAD OR WISHED YOU COULD GO TO SLEEP AND NOT WAKE UP?: NO
2. HAVE YOU ACTUALLY HAD ANY THOUGHTS OF KILLING YOURSELF IN THE PAST MONTH?: NO
6. HAVE YOU EVER DONE ANYTHING, STARTED TO DO ANYTHING, OR PREPARED TO DO ANYTHING TO END YOUR LIFE?: NO

## 2024-04-21 ASSESSMENT — ACTIVITIES OF DAILY LIVING (ADL)
ADLS_ACUITY_SCORE: 35
ADLS_ACUITY_SCORE: 35

## 2024-04-21 NOTE — DISCHARGE INSTRUCTIONS
Home to rest tonight    Your prescription and take as directed    You may return to work in 3 days if your fever is gone    Please make an appointment to follow up with your primary care referral or Your Primary Care Provider in 1 week if not better.

## 2024-04-21 NOTE — Clinical Note
Chucho Sarah was seen and treated in our emergency department on 4/21/2024.  He may return to work on 04/24/2024.       If you have any questions or concerns, please don't hesitate to call.      Omid Coley MD

## 2024-04-21 NOTE — ED PROVIDER NOTES
[facility] EMERGENCY DEPARTMENT ([facility])    4/21/24      ED PROVIDER NOTE    History     Chief Complaint   Patient presents with    Fever     Ongoing fever for 3 days, body aches, cough, sore throat, no relief with tylenol.     HPI  Chucho Sarah is a 28 year old male who Padmaja is a 28-year-old male who states that he developed a fever 3 days ago and has been taking Tylenol around-the-clock.  Patient states that he is have a mild sore throat and states he has had a nonproductive cough along with bodyaches myalgias and generalized malaise.  Patient denies any abdominal pain states he has had some nausea but no vomiting no diarrhea no UTI symptoms and presents here to the ER for evaluation.    Past Medical History  Past Medical History:   Diagnosis Date    Tachycardia      Past Surgical History:   Procedure Laterality Date    EYE SURGERY       diltiazem ER (DILT-XR) 120 MG 24 hr capsule  metoprolol succinate (TOPROL-XL) 25 MG 24 hr tablet    No Known Allergies    Family History  No family history on file.    Social History   Social History     Tobacco Use    Smoking status: Never    Smokeless tobacco: Never   Substance Use Topics    Alcohol use: No    Drug use: No         A complete review of systems was performed with pertinent positives and negatives noted in the HPI, and all other systems negative.    Physical Exam   BP: 132/83  Pulse: (!) 140  Temp: (!) 101.1  F (38.4  C)  Resp: 16  SpO2: 98 %  Physical Exam  Vitals and nursing note reviewed.   Constitutional:       Comments: Ambulatory conversant but appears uncomfortable secondary to his fever   HENT:      Head: Atraumatic.      Right Ear: Tympanic membrane normal.      Left Ear: Tympanic membrane normal.      Mouth/Throat:      Pharynx: Oropharynx is clear.   Eyes:      Extraocular Movements: Extraocular movements intact.      Pupils: Pupils are equal, round, and reactive to light.   Cardiovascular:      Rate and Rhythm: Regular rhythm.  Tachycardia present.      Heart sounds: Normal heart sounds.   Pulmonary:      Breath sounds: Normal breath sounds.   Abdominal:      Palpations: Abdomen is soft.      Tenderness: There is no abdominal tenderness.   Musculoskeletal:         General: No deformity.      Cervical back: Neck supple.   Neurological:      General: No focal deficit present.      Mental Status: He is alert and oriented to person, place, and time.   Psychiatric:         Mood and Affect: Mood normal.           ED Course, Procedures, & Data      Orders Placed This Encounter   Procedures    Chest XR,  PA & LAT    Symptomatic Influenza A/B, RSV, & SARS-CoV2 PCR (COVID-19)    Comprehensive metabolic panel    Lactic acid whole blood    CBC with platelets and differential    Lactic acid whole blood    Peripheral IV catheter    Vital signs    Group A Streptococcus PCR Throat Swab    CBC with platelets differential       Procedures       Results for orders placed or performed during the hospital encounter of 04/21/24 (from the past 24 hour(s))   Symptomatic Influenza A/B, RSV, & SARS-CoV2 PCR (COVID-19) Nasopharyngeal    Specimen: Nasopharyngeal; Swab   Result Value Ref Range    Influenza A PCR Negative Negative    Influenza B PCR Positive (A) Negative    RSV PCR Negative Negative    SARS CoV2 PCR Negative Negative    Narrative    Testing was performed using the Xpert Xpress CoV2/Flu/RSV Assay on the [employer] GeneXpert Instrument. This test should be ordered for the detection of SARS-CoV-2, influenza, and RSV viruses in individuals who meet clinical and/or epidemiological criteria. Test performance is unknown in asymptomatic patients. This test is for in vitro diagnostic use under the FDA EUA for laboratories certified under CLIA to perform high or moderate complexity testing. This test has not been FDA cleared or approved. A negative result does not rule out the presence of PCR inhibitors in the specimen or target RNA in concentration below the limit  of detection for the assay. If only one viral target is positive but coinfection with multiple targets is suspected, the sample should be re-tested with another FDA cleared, approved, or authorized test, if coinfection would change clinical management. This test was validated by the [facility]. These laboratories are certified under the Clinical Laboratory Improvement Amendments of 1988 (CLIA-88) as qualified to perform high complexity laboratory testing.   Group A Streptococcus PCR Throat Swab    Specimen: Throat; Swab   Result Value Ref Range    Group A strep by PCR Not Detected Not Detected    Narrative    The Xpert Xpress Strep A test, performed on the "[employer] Systems, is a rapid, qualitative in vitro diagnostic test for the detection of Streptococcus pyogenes (Group A ß-hemolytic Streptococcus, Strep A) in throat swab specimens from patients with signs and symptoms of pharyngitis. The Xpert Xpress Strep A test can be used as an aid in the diagnosis of Group A Streptococcal pharyngitis. The assay is not intended to monitor treatment for Group A Streptococcus infections. The Xpert Xpress Strep A test utilizes an automated real-time polymerase chain reaction (PCR) to detect Streptococcus pyogenes DNA.   Chest XR,  PA & LAT    Narrative    EXAM: XR CHEST 2 VIEWS  LOCATION: [facility]  DATE: 4/21/2024    INDICATION: fever  COMPARISON: 11/12/2018      Impression    IMPRESSION: Negative chest.   CBC with platelets differential    Narrative    The following orders were created for panel order CBC with platelets differential.  Procedure                               Abnormality         Status                     ---------                               -----------         ------                     CBC with platelets and d...[[id number]]                      Final result                 Please view results for these tests on the individual  orders.   Comprehensive metabolic panel   Result Value Ref Range    Sodium 136 135 - 145 mmol/L    Potassium 3.2 (L) 3.4 - 5.3 mmol/L    Carbon Dioxide (CO2) 23 22 - 29 mmol/L    Anion Gap 11 7 - 15 mmol/L    Urea Nitrogen 8.8 6.0 - 20.0 mg/dL    Creatinine 0.68 0.67 - 1.17 mg/dL    GFR Estimate >90 >60 mL/min/1.73m2    Calcium 8.3 (L) 8.6 - 10.0 mg/dL    Chloride 102 98 - 107 mmol/L    Glucose 114 (H) 70 - 99 mg/dL    Alkaline Phosphatase 68 40 - 150 U/L    AST 24 0 - 45 U/L    ALT 18 0 - 70 U/L    Protein Total 6.8 6.4 - 8.3 g/dL    Albumin 4.2 3.5 - 5.2 g/dL    Bilirubin Total 0.8 <=1.2 mg/dL   Lactic acid whole blood   Result Value Ref Range    Lactic Acid 2.4 (H) 0.7 - 2.0 mmol/L   CBC with platelets and differential   Result Value Ref Range    WBC Count 6.0 4.0 - 11.0 10e3/uL    RBC Count 4.72 4.40 - 5.90 10e6/uL    Hemoglobin 14.8 13.3 - 17.7 g/dL    Hematocrit 43.5 40.0 - 53.0 %    MCV 92 78 - 100 fL    MCH 31.4 26.5 - 33.0 pg    MCHC 34.0 31.5 - 36.5 g/dL    RDW 11.5 10.0 - 15.0 %    Platelet Count 183 150 - 450 10e3/uL    % Neutrophils 72 %    % Lymphocytes 13 %    % Monocytes 13 %    % Eosinophils 2 %    % Basophils 0 %    % Immature Granulocytes 0 %    NRBCs per 100 WBC 0 <1 /100    Absolute Neutrophils 4.3 1.6 - 8.3 10e3/uL    Absolute Lymphocytes 0.8 0.8 - 5.3 10e3/uL    Absolute Monocytes 0.8 0.0 - 1.3 10e3/uL    Absolute Eosinophils 0.1 0.0 - 0.7 10e3/uL    Absolute Basophils 0.0 0.0 - 0.2 10e3/uL    Absolute Immature Granulocytes 0.0 <=0.4 10e3/uL    Absolute NRBCs 0.0 10e3/uL     Medications   sodium chloride (PF) 0.9% PF flush 3 mL (3 mLs Intracatheter Not Given 4/21/24 1825)   sodium chloride (PF) 0.9% PF flush 3 mL (has no administration in time range)   ibuprofen (ADVIL/MOTRIN) tablet 800 mg (800 mg Oral $Given 4/21/24 1737)   sodium chloride 0.9% BOLUS 1,000 mL (0 mLs Intravenous Stopped 4/21/24 9874)       Critical care was not performed.     Medical Decision Making  The patient's  presentation was of moderate complexity (an acute illness with systemic symptoms).    The patient's evaluation involved:  ordering and/or review of 3+ test(s) in this encounter (see above)    The patient's management necessitated moderate risk (prescription drug management including medications given in the ED).    Assessment & Plan      I have reviewed the nursing notes.    I have reviewed the findings, diagnosis, plan and need for follow up with the patient.    New Prescriptions    OSELTAMIVIR (TAMIFLU) 75 MG CAPSULE    Take 1 capsule (75 mg) by mouth 2 times daily for 5 days       Final diagnoses:   Influenza B     Home to rest tonight    Your prescription and take as directed    You may return to work in 3 days if your fever is gone    Please make an appointment to follow up with your primary care referral or Your Primary Care Provider in 1 week if not better.    Routine discharge instructions were given for this diagnosis      Omid Coley Md  [facility] EMERGENCY DEPARTMENT  4/21/2024     Omid Coley MD  04/21/24 4029